# Patient Record
Sex: MALE | Race: WHITE | Employment: OTHER | URBAN - NONMETROPOLITAN AREA
[De-identification: names, ages, dates, MRNs, and addresses within clinical notes are randomized per-mention and may not be internally consistent; named-entity substitution may affect disease eponyms.]

---

## 2017-01-12 ENCOUNTER — OFFICE VISIT (OUTPATIENT)
Dept: PULMONOLOGY | Age: 82
End: 2017-01-12

## 2017-01-12 VITALS
DIASTOLIC BLOOD PRESSURE: 62 MMHG | SYSTOLIC BLOOD PRESSURE: 128 MMHG | RESPIRATION RATE: 16 BRPM | BODY MASS INDEX: 30.93 KG/M2 | OXYGEN SATURATION: 95 % | WEIGHT: 163.8 LBS | HEART RATE: 78 BPM | HEIGHT: 61 IN

## 2017-01-12 DIAGNOSIS — Z99.89 OSA ON CPAP: Primary | ICD-10-CM

## 2017-01-12 DIAGNOSIS — G47.33 OSA ON CPAP: Primary | ICD-10-CM

## 2017-01-12 PROCEDURE — 99213 OFFICE O/P EST LOW 20 MIN: CPT | Performed by: INTERNAL MEDICINE

## 2017-04-27 ENCOUNTER — OFFICE VISIT (OUTPATIENT)
Dept: PULMONOLOGY | Age: 82
End: 2017-04-27

## 2017-04-27 VITALS
WEIGHT: 162 LBS | HEART RATE: 70 BPM | HEIGHT: 61 IN | DIASTOLIC BLOOD PRESSURE: 62 MMHG | OXYGEN SATURATION: 96 % | BODY MASS INDEX: 30.58 KG/M2 | SYSTOLIC BLOOD PRESSURE: 124 MMHG | RESPIRATION RATE: 16 BRPM

## 2017-04-27 DIAGNOSIS — Z99.89 OSA ON CPAP: ICD-10-CM

## 2017-04-27 DIAGNOSIS — G47.33 OSA ON CPAP: ICD-10-CM

## 2017-04-27 PROCEDURE — 1036F TOBACCO NON-USER: CPT | Performed by: INTERNAL MEDICINE

## 2017-04-27 PROCEDURE — G8419 CALC BMI OUT NRM PARAM NOF/U: HCPCS | Performed by: INTERNAL MEDICINE

## 2017-04-27 PROCEDURE — 4040F PNEUMOC VAC/ADMIN/RCVD: CPT | Performed by: INTERNAL MEDICINE

## 2017-04-27 PROCEDURE — 1123F ACP DISCUSS/DSCN MKR DOCD: CPT | Performed by: INTERNAL MEDICINE

## 2017-04-27 PROCEDURE — G8427 DOCREV CUR MEDS BY ELIG CLIN: HCPCS | Performed by: INTERNAL MEDICINE

## 2017-04-27 PROCEDURE — 99213 OFFICE O/P EST LOW 20 MIN: CPT | Performed by: INTERNAL MEDICINE

## 2017-06-09 DIAGNOSIS — Z99.89 OSA ON CPAP: ICD-10-CM

## 2017-06-09 DIAGNOSIS — G47.33 OSA ON CPAP: ICD-10-CM

## 2017-08-10 ENCOUNTER — OFFICE VISIT (OUTPATIENT)
Dept: PULMONOLOGY | Age: 82
End: 2017-08-10
Payer: MEDICARE

## 2017-08-10 VITALS
WEIGHT: 160 LBS | HEIGHT: 61 IN | OXYGEN SATURATION: 95 % | BODY MASS INDEX: 30.21 KG/M2 | DIASTOLIC BLOOD PRESSURE: 70 MMHG | SYSTOLIC BLOOD PRESSURE: 138 MMHG | HEART RATE: 64 BPM

## 2017-08-10 DIAGNOSIS — Z99.89 OSA ON CPAP: Primary | ICD-10-CM

## 2017-08-10 DIAGNOSIS — G47.33 OSA ON CPAP: Primary | ICD-10-CM

## 2017-08-10 PROCEDURE — G8417 CALC BMI ABV UP PARAM F/U: HCPCS | Performed by: INTERNAL MEDICINE

## 2017-08-10 PROCEDURE — 4040F PNEUMOC VAC/ADMIN/RCVD: CPT | Performed by: INTERNAL MEDICINE

## 2017-08-10 PROCEDURE — G8427 DOCREV CUR MEDS BY ELIG CLIN: HCPCS | Performed by: INTERNAL MEDICINE

## 2017-08-10 PROCEDURE — 99213 OFFICE O/P EST LOW 20 MIN: CPT | Performed by: INTERNAL MEDICINE

## 2017-08-10 PROCEDURE — 1123F ACP DISCUSS/DSCN MKR DOCD: CPT | Performed by: INTERNAL MEDICINE

## 2017-08-10 PROCEDURE — 1036F TOBACCO NON-USER: CPT | Performed by: INTERNAL MEDICINE

## 2017-12-15 ENCOUNTER — HOSPITAL ENCOUNTER (INPATIENT)
Age: 82
LOS: 2 days | Discharge: HOME HEALTH | DRG: 310 | End: 2017-12-17
Attending: FAMILY MEDICINE
Payer: MEDICARE

## 2017-12-15 DIAGNOSIS — R00.1 BRADYCARDIA: Primary | ICD-10-CM

## 2017-12-15 LAB
ANION GAP SERPL CALCULATED.3IONS-SCNC: 15 MEQ/L (ref 8–16)
BUN BLDV-MCNC: 26 MG/DL (ref 7–22)
CALCIUM SERPL-MCNC: 8.9 MG/DL (ref 8.5–10.5)
CHLORIDE BLD-SCNC: 100 MEQ/L (ref 98–111)
CO2: 24 MEQ/L (ref 23–33)
CREAT SERPL-MCNC: 1.1 MG/DL (ref 0.4–1.2)
EKG ATRIAL RATE: 72 BPM
EKG P AXIS: 67 DEGREES
EKG P-R INTERVAL: 224 MS
EKG Q-T INTERVAL: 434 MS
EKG QRS DURATION: 144 MS
EKG QTC CALCULATION (BAZETT): 475 MS
EKG R AXIS: -85 DEGREES
EKG T AXIS: -24 DEGREES
EKG VENTRICULAR RATE: 72 BPM
GFR SERPL CREATININE-BSD FRML MDRD: 64 ML/MIN/1.73M2
GLUCOSE BLD-MCNC: 111 MG/DL (ref 70–108)
HCT VFR BLD CALC: 37 % (ref 42–52)
HEMOGLOBIN: 12.4 GM/DL (ref 14–18)
MCH RBC QN AUTO: 33.6 PG (ref 27–31)
MCHC RBC AUTO-ENTMCNC: 33.6 GM/DL (ref 33–37)
MCV RBC AUTO: 100 FL (ref 80–94)
MRSA SCREEN RT-PCR: NEGATIVE
PDW BLD-RTO: 13.1 % (ref 11.5–14.5)
PLATELET # BLD: 217 THOU/MM3 (ref 130–400)
PMV BLD AUTO: 7.7 MCM (ref 7.4–10.4)
POTASSIUM SERPL-SCNC: 4.5 MEQ/L (ref 3.5–5.2)
RBC # BLD: 3.7 MILL/MM3 (ref 4.7–6.1)
SODIUM BLD-SCNC: 139 MEQ/L (ref 135–145)
WBC # BLD: 10.4 THOU/MM3 (ref 4.8–10.8)

## 2017-12-15 PROCEDURE — 2580000003 HC RX 258: Performed by: FAMILY MEDICINE

## 2017-12-15 PROCEDURE — 2060000000 HC ICU INTERMEDIATE R&B

## 2017-12-15 PROCEDURE — 6360000002 HC RX W HCPCS: Performed by: ORTHOPAEDIC SURGERY

## 2017-12-15 PROCEDURE — 93005 ELECTROCARDIOGRAM TRACING: CPT

## 2017-12-15 PROCEDURE — S0028 INJECTION, FAMOTIDINE, 20 MG: HCPCS | Performed by: FAMILY MEDICINE

## 2017-12-15 PROCEDURE — 87641 MR-STAPH DNA AMP PROBE: CPT

## 2017-12-15 PROCEDURE — 36415 COLL VENOUS BLD VENIPUNCTURE: CPT

## 2017-12-15 PROCEDURE — A6252 ABSORPT DRG >16 <=48 W/O BDR: HCPCS

## 2017-12-15 PROCEDURE — 85027 COMPLETE CBC AUTOMATED: CPT

## 2017-12-15 PROCEDURE — 6370000000 HC RX 637 (ALT 250 FOR IP): Performed by: ORTHOPAEDIC SURGERY

## 2017-12-15 PROCEDURE — 2580000003 HC RX 258: Performed by: ORTHOPAEDIC SURGERY

## 2017-12-15 PROCEDURE — 80048 BASIC METABOLIC PNL TOTAL CA: CPT

## 2017-12-15 PROCEDURE — 6370000000 HC RX 637 (ALT 250 FOR IP): Performed by: FAMILY MEDICINE

## 2017-12-15 PROCEDURE — 87081 CULTURE SCREEN ONLY: CPT

## 2017-12-15 PROCEDURE — 2500000003 HC RX 250 WO HCPCS: Performed by: FAMILY MEDICINE

## 2017-12-15 PROCEDURE — 99223 1ST HOSP IP/OBS HIGH 75: CPT | Performed by: FAMILY MEDICINE

## 2017-12-15 RX ORDER — ACETAMINOPHEN 325 MG/1
650 TABLET ORAL EVERY 4 HOURS PRN
Status: DISCONTINUED | OUTPATIENT
Start: 2017-12-15 | End: 2017-12-17 | Stop reason: HOSPADM

## 2017-12-15 RX ORDER — TAMSULOSIN HYDROCHLORIDE 0.4 MG/1
0.4 CAPSULE ORAL 2 TIMES DAILY
Status: DISCONTINUED | OUTPATIENT
Start: 2017-12-15 | End: 2017-12-17 | Stop reason: HOSPADM

## 2017-12-15 RX ORDER — BETHANECHOL CHLORIDE 25 MG/1
50 TABLET ORAL 3 TIMES DAILY
Status: DISCONTINUED | OUTPATIENT
Start: 2017-12-15 | End: 2017-12-17 | Stop reason: HOSPADM

## 2017-12-15 RX ORDER — ONDANSETRON 2 MG/ML
4 INJECTION INTRAMUSCULAR; INTRAVENOUS EVERY 6 HOURS PRN
Status: DISCONTINUED | OUTPATIENT
Start: 2017-12-15 | End: 2017-12-17 | Stop reason: HOSPADM

## 2017-12-15 RX ORDER — ISOSORBIDE MONONITRATE 60 MG/1
60 TABLET, EXTENDED RELEASE ORAL DAILY
Status: DISCONTINUED | OUTPATIENT
Start: 2017-12-15 | End: 2017-12-17 | Stop reason: HOSPADM

## 2017-12-15 RX ORDER — RAMIPRIL 5 MG/1
5 CAPSULE ORAL 2 TIMES DAILY
Status: DISCONTINUED | OUTPATIENT
Start: 2017-12-15 | End: 2017-12-17 | Stop reason: HOSPADM

## 2017-12-15 RX ORDER — MORPHINE SULFATE 2 MG/ML
2 INJECTION, SOLUTION INTRAMUSCULAR; INTRAVENOUS EVERY 4 HOURS PRN
Status: DISCONTINUED | OUTPATIENT
Start: 2017-12-15 | End: 2017-12-15

## 2017-12-15 RX ORDER — HYDROCODONE BITARTRATE AND ACETAMINOPHEN 5; 325 MG/1; MG/1
1 TABLET ORAL EVERY 6 HOURS PRN
Status: DISCONTINUED | OUTPATIENT
Start: 2017-12-15 | End: 2017-12-17 | Stop reason: HOSPADM

## 2017-12-15 RX ORDER — PANTOPRAZOLE SODIUM 40 MG/1
40 TABLET, DELAYED RELEASE ORAL DAILY
Status: DISCONTINUED | OUTPATIENT
Start: 2017-12-15 | End: 2017-12-17 | Stop reason: HOSPADM

## 2017-12-15 RX ORDER — ASPIRIN 81 MG/1
81 TABLET ORAL DAILY
Status: DISCONTINUED | OUTPATIENT
Start: 2017-12-15 | End: 2017-12-15

## 2017-12-15 RX ORDER — SODIUM CHLORIDE 0.9 % (FLUSH) 0.9 %
10 SYRINGE (ML) INJECTION EVERY 12 HOURS SCHEDULED
Status: DISCONTINUED | OUTPATIENT
Start: 2017-12-15 | End: 2017-12-17 | Stop reason: HOSPADM

## 2017-12-15 RX ORDER — SODIUM CHLORIDE 0.9 % (FLUSH) 0.9 %
10 SYRINGE (ML) INJECTION PRN
Status: DISCONTINUED | OUTPATIENT
Start: 2017-12-15 | End: 2017-12-17 | Stop reason: HOSPADM

## 2017-12-15 RX ORDER — SODIUM CHLORIDE 9 MG/ML
INJECTION, SOLUTION INTRAVENOUS CONTINUOUS
Status: DISCONTINUED | OUTPATIENT
Start: 2017-12-15 | End: 2017-12-17 | Stop reason: HOSPADM

## 2017-12-15 RX ORDER — AMLODIPINE BESYLATE 5 MG/1
5 TABLET ORAL 2 TIMES DAILY
Status: DISCONTINUED | OUTPATIENT
Start: 2017-12-15 | End: 2017-12-17 | Stop reason: HOSPADM

## 2017-12-15 RX ORDER — MORPHINE SULFATE 2 MG/ML
1 INJECTION, SOLUTION INTRAMUSCULAR; INTRAVENOUS EVERY 4 HOURS PRN
Status: DISCONTINUED | OUTPATIENT
Start: 2017-12-15 | End: 2017-12-15

## 2017-12-15 RX ORDER — ATORVASTATIN CALCIUM 40 MG/1
40 TABLET, FILM COATED ORAL NIGHTLY
Status: DISCONTINUED | OUTPATIENT
Start: 2017-12-15 | End: 2017-12-17 | Stop reason: HOSPADM

## 2017-12-15 RX ADMIN — TAMSULOSIN HYDROCHLORIDE 0.4 MG: 0.4 CAPSULE ORAL at 19:42

## 2017-12-15 RX ADMIN — AMLODIPINE BESYLATE 5 MG: 5 TABLET ORAL at 19:42

## 2017-12-15 RX ADMIN — RAMIPRIL 5 MG: 5 CAPSULE ORAL at 19:42

## 2017-12-15 RX ADMIN — HYDROCODONE BITARTRATE AND ACETAMINOPHEN 1 TABLET: 5; 325 TABLET ORAL at 23:51

## 2017-12-15 RX ADMIN — FAMOTIDINE 20 MG: 10 INJECTION, SOLUTION INTRAVENOUS at 21:28

## 2017-12-15 RX ADMIN — CEFAZOLIN SODIUM 2 G: 1 INJECTION, POWDER, FOR SOLUTION INTRAMUSCULAR; INTRAVENOUS at 22:16

## 2017-12-15 RX ADMIN — SODIUM CHLORIDE: 9 INJECTION, SOLUTION INTRAVENOUS at 18:00

## 2017-12-15 RX ADMIN — Medication 10 ML: at 21:28

## 2017-12-15 RX ADMIN — BETHANECHOL CHLORIDE 25 MG 50 MG: 25 TABLET ORAL at 23:51

## 2017-12-15 RX ADMIN — ATORVASTATIN CALCIUM 40 MG: 40 TABLET, FILM COATED ORAL at 19:42

## 2017-12-15 ASSESSMENT — PAIN DESCRIPTION - PROGRESSION
CLINICAL_PROGRESSION: GRADUALLY IMPROVING
CLINICAL_PROGRESSION: GRADUALLY IMPROVING

## 2017-12-15 ASSESSMENT — PAIN SCALES - GENERAL
PAINLEVEL_OUTOF10: 4
PAINLEVEL_OUTOF10: 3

## 2017-12-15 ASSESSMENT — PAIN DESCRIPTION - PAIN TYPE: TYPE: SURGICAL PAIN

## 2017-12-15 ASSESSMENT — PAIN DESCRIPTION - ORIENTATION: ORIENTATION: RIGHT

## 2017-12-15 ASSESSMENT — PAIN DESCRIPTION - ONSET: ONSET: ON-GOING

## 2017-12-15 ASSESSMENT — PAIN DESCRIPTION - DESCRIPTORS: DESCRIPTORS: ACHING

## 2017-12-15 ASSESSMENT — PAIN DESCRIPTION - LOCATION: LOCATION: HIP

## 2017-12-15 ASSESSMENT — PAIN DESCRIPTION - FREQUENCY: FREQUENCY: INTERMITTENT

## 2017-12-15 NOTE — H&P
POTASSIUM PO Take 99 mg by mouth 2 times daily     Historical Provider, MD   CPAP Machine MISC by Does not apply route Please change CPAP pressure to 12 cm H20. 7/14/16   Hudson Santacruz MD   isosorbide mononitrate (IMDUR) 30 MG CR tablet Take 60 mg by mouth daily     Historical Provider, MD   ascorbic acid (VITAMIN C) 500 MG tablet Take 500 mg by mouth daily. Historical Provider, MD   atorvastatin (LIPITOR) 20 MG tablet Take 40 mg by mouth daily     Historical Provider, MD   b complex vitamins capsule Take 1 capsule by mouth daily. Historical Provider, MD   bethanechol (URECHOLINE) 25 MG tablet   Take 50 mg by mouth 3 times daily     Historical Provider, MD   clopidogrel (PLAVIX) 75 MG tablet   Take 75 mg by mouth three times a week     Historical Provider, MD   ramipril (ALTACE) 2.5 MG capsule Take 5 mg by mouth 2 times daily. Historical Provider, MD   amLODIPine (NORVASC) 5 MG tablet Take by mouth 2 times daily     Historical Provider, MD   aspirin 81 MG tablet Take 81 mg by mouth daily. Historical Provider, MD   tamsulosin (FLOMAX) 0.4 MG capsule Take 0.4 mg by mouth 2 times daily     Historical Provider, MD       Allergies:  Pcn [penicillins]    Social History:      The patient currently lives at home    TOBACCO:   reports that he quit smoking about 59 years ago. His smoking use included Cigarettes. He quit after 8.00 years of use. He quit smokeless tobacco use about 10 years ago. His smokeless tobacco use included Chew. ETOH:   reports that he does not drink alcohol. Family History:    Reviewed in detail and negative for DM, CAD, Cancer, CVA. Positive as follows:    No family history on file. Diet:  DIET CARDIAC;    REVIEW OF SYSTEMS:   Pertinent positives as noted in the HPI. All other systems reviewed and negative. PHYSICAL EXAM:    There were no vitals taken for this visit. General appearance:  No apparent distress, appears stated age and cooperative.   HEENT:  Normal

## 2017-12-16 LAB
LV EF: 63 %
LVEF MODALITY: NORMAL
TSH SERPL DL<=0.05 MIU/L-ACNC: 0.69 UIU/ML (ref 0.4–4.2)

## 2017-12-16 PROCEDURE — S0028 INJECTION, FAMOTIDINE, 20 MG: HCPCS | Performed by: PHARMACIST

## 2017-12-16 PROCEDURE — 99233 SBSQ HOSP IP/OBS HIGH 50: CPT | Performed by: INTERNAL MEDICINE

## 2017-12-16 PROCEDURE — 6370000000 HC RX 637 (ALT 250 FOR IP): Performed by: INTERNAL MEDICINE

## 2017-12-16 PROCEDURE — 2060000000 HC ICU INTERMEDIATE R&B

## 2017-12-16 PROCEDURE — 6370000000 HC RX 637 (ALT 250 FOR IP): Performed by: ORTHOPAEDIC SURGERY

## 2017-12-16 PROCEDURE — 6360000002 HC RX W HCPCS: Performed by: ORTHOPAEDIC SURGERY

## 2017-12-16 PROCEDURE — 2580000003 HC RX 258: Performed by: FAMILY MEDICINE

## 2017-12-16 PROCEDURE — 93306 TTE W/DOPPLER COMPLETE: CPT

## 2017-12-16 PROCEDURE — 84443 ASSAY THYROID STIM HORMONE: CPT

## 2017-12-16 PROCEDURE — 6370000000 HC RX 637 (ALT 250 FOR IP): Performed by: FAMILY MEDICINE

## 2017-12-16 PROCEDURE — 99223 1ST HOSP IP/OBS HIGH 75: CPT | Performed by: INTERNAL MEDICINE

## 2017-12-16 PROCEDURE — 36415 COLL VENOUS BLD VENIPUNCTURE: CPT

## 2017-12-16 PROCEDURE — 2500000003 HC RX 250 WO HCPCS: Performed by: PHARMACIST

## 2017-12-16 RX ORDER — HYDROCODONE BITARTRATE AND ACETAMINOPHEN 5; 325 MG/1; MG/1
2 TABLET ORAL EVERY 6 HOURS PRN
Qty: 42 TABLET | Refills: 0 | Status: SHIPPED | OUTPATIENT
Start: 2017-12-16 | End: 2017-12-23

## 2017-12-16 RX ORDER — METOPROLOL SUCCINATE 25 MG/1
25 TABLET, EXTENDED RELEASE ORAL DAILY
Status: DISCONTINUED | OUTPATIENT
Start: 2017-12-16 | End: 2017-12-17 | Stop reason: HOSPADM

## 2017-12-16 RX ORDER — HYDROCODONE BITARTRATE AND ACETAMINOPHEN 5; 325 MG/1; MG/1
2 TABLET ORAL EVERY 6 HOURS PRN
Qty: 42 TABLET | Refills: 0 | Status: CANCELLED | OUTPATIENT
Start: 2017-12-16 | End: 2017-12-23

## 2017-12-16 RX ADMIN — HYDROCODONE BITARTRATE AND ACETAMINOPHEN 1 TABLET: 5; 325 TABLET ORAL at 14:46

## 2017-12-16 RX ADMIN — RAMIPRIL 5 MG: 5 CAPSULE ORAL at 20:26

## 2017-12-16 RX ADMIN — BETHANECHOL CHLORIDE 25 MG 50 MG: 25 TABLET ORAL at 14:49

## 2017-12-16 RX ADMIN — AMLODIPINE BESYLATE 5 MG: 5 TABLET ORAL at 08:12

## 2017-12-16 RX ADMIN — CEFAZOLIN SODIUM 2 G: 2 SOLUTION INTRAVENOUS at 14:47

## 2017-12-16 RX ADMIN — ATORVASTATIN CALCIUM 40 MG: 40 TABLET, FILM COATED ORAL at 20:27

## 2017-12-16 RX ADMIN — BETHANECHOL CHLORIDE 25 MG 50 MG: 25 TABLET ORAL at 20:26

## 2017-12-16 RX ADMIN — TAMSULOSIN HYDROCHLORIDE 0.4 MG: 0.4 CAPSULE ORAL at 08:16

## 2017-12-16 RX ADMIN — RAMIPRIL 5 MG: 5 CAPSULE ORAL at 08:15

## 2017-12-16 RX ADMIN — PANTOPRAZOLE SODIUM 40 MG: 40 TABLET, DELAYED RELEASE ORAL at 08:15

## 2017-12-16 RX ADMIN — METOPROLOL SUCCINATE 25 MG: 25 TABLET, EXTENDED RELEASE ORAL at 14:49

## 2017-12-16 RX ADMIN — Medication 10 ML: at 08:16

## 2017-12-16 RX ADMIN — ISOSORBIDE MONONITRATE 60 MG: 60 TABLET, EXTENDED RELEASE ORAL at 08:15

## 2017-12-16 RX ADMIN — ASPIRIN 325 MG: 325 TABLET, DELAYED RELEASE ORAL at 09:08

## 2017-12-16 RX ADMIN — BETHANECHOL CHLORIDE 25 MG 50 MG: 25 TABLET ORAL at 08:14

## 2017-12-16 RX ADMIN — Medication 10 ML: at 20:27

## 2017-12-16 RX ADMIN — AMLODIPINE BESYLATE 5 MG: 5 TABLET ORAL at 20:26

## 2017-12-16 RX ADMIN — HYDROCODONE BITARTRATE AND ACETAMINOPHEN 1 TABLET: 5; 325 TABLET ORAL at 08:09

## 2017-12-16 RX ADMIN — FAMOTIDINE 20 MG: 10 INJECTION, SOLUTION INTRAVENOUS at 11:08

## 2017-12-16 RX ADMIN — HYDROCODONE BITARTRATE AND ACETAMINOPHEN 1 TABLET: 5; 325 TABLET ORAL at 21:41

## 2017-12-16 RX ADMIN — TAMSULOSIN HYDROCHLORIDE 0.4 MG: 0.4 CAPSULE ORAL at 20:26

## 2017-12-16 RX ADMIN — CEFAZOLIN SODIUM 2 G: 2 SOLUTION INTRAVENOUS at 05:34

## 2017-12-16 ASSESSMENT — PAIN DESCRIPTION - ORIENTATION
ORIENTATION: RIGHT
ORIENTATION: RIGHT

## 2017-12-16 ASSESSMENT — PAIN DESCRIPTION - PROGRESSION

## 2017-12-16 ASSESSMENT — PAIN DESCRIPTION - LOCATION
LOCATION: HIP
LOCATION: HIP

## 2017-12-16 ASSESSMENT — PAIN DESCRIPTION - ONSET
ONSET: ON-GOING
ONSET: ON-GOING

## 2017-12-16 ASSESSMENT — PAIN DESCRIPTION - DESCRIPTORS
DESCRIPTORS: ACHING
DESCRIPTORS: ACHING

## 2017-12-16 ASSESSMENT — PAIN SCALES - GENERAL
PAINLEVEL_OUTOF10: 4
PAINLEVEL_OUTOF10: 3
PAINLEVEL_OUTOF10: 2
PAINLEVEL_OUTOF10: 6
PAINLEVEL_OUTOF10: 0
PAINLEVEL_OUTOF10: 0
PAINLEVEL_OUTOF10: 4

## 2017-12-16 ASSESSMENT — PAIN DESCRIPTION - FREQUENCY
FREQUENCY: INTERMITTENT
FREQUENCY: INTERMITTENT

## 2017-12-16 ASSESSMENT — PAIN DESCRIPTION - PAIN TYPE
TYPE: SURGICAL PAIN
TYPE: SURGICAL PAIN

## 2017-12-16 NOTE — CONSULTS
Inpatient consult to Cardiology  Consult performed by: Yusuf Vidales ordered by: Jose De Jesus Whatley A          Episode of post op Sinus Bradycardia   CAD s/p Stent 5 yrs back  S/p Rt Hip replacement\  Post op day 1    Plan  Echo  Holter on DC  May resume toprol xl 25 po qd rhys    [id number]    Maryellen Steiner MD

## 2017-12-16 NOTE — PROGRESS NOTES
Renal Adjustment Per Protocol  Recent Labs      12/15/17   1655   BUN  26*       Recent Labs      12/15/17   1655   CREATININE  1.1       Estimated Creatinine Clearance: 43 mL/min (based on SCr of 1.1 mg/dL).       Plan: Change Pepcid 20 mg daily     Derrell Navarrete PharmD 12/16/2017 8:43 AM

## 2017-12-16 NOTE — PROGRESS NOTES
Progress Note    Subjective:     Post-Operative Day: 1 Status Post right Total Hip Arthroplasty  Systemic or Specific Complaints:No Complaints    Objective:   VITALS:  /65   Pulse 65   Temp 98.3 °F (36.8 °C) (Oral)   Resp 16   Ht 5' 2\" (1.575 m)   Wt 164 lb (74.4 kg)   SpO2 93%   BMI 30.00 kg/m²     General: alert, appears stated age and cooperative   Wound: Wound clean and dry no evidence of infection. DVT Exam: No evidence of DVT seen on physical exam.   Abdomen soft and non tender  NVI in lower extremity. Thigh swollen but soft. Moving foot and ankle. Data Review  CBC:   Lab Results   Component Value Date    WBC 10.4 12/15/2017    RBC 3.70 12/15/2017    HGB 12.4 12/15/2017    HCT 37.0 12/15/2017     12/15/2017     BMP:   Lab Results   Component Value Date     12/15/2017    K 4.5 12/15/2017     12/15/2017    CO2 24 12/15/2017    BUN 26 12/15/2017    CREATININE 1.1 12/15/2017    GLUCOSE 111 12/15/2017       Assessment:     Status Post right Total Hip Arthroplasty.  Complications: bradycardia     Plan:      1:  Continue Total Hip Replacement protocol   2:  Continue Deep venous thrombosis prophylaxis  3:  Continue physical therapy   4:  Continue Pain Control  5:  Monitor Labs  6:  Discharge pending; ok from ortho as long as he does well with PT, asa 325 and plavix continued, norco script in chart    Tasneem Duque PA-C in collaboration with Dr. Mariaa Gant

## 2017-12-16 NOTE — PLAN OF CARE
family. Patient and family verbalize understanding of the plan of care and contribute to goal setting.

## 2017-12-16 NOTE — DISCHARGE INSTR - DIET

## 2017-12-16 NOTE — PROGRESS NOTES
[address] THERAPY MISSED TREATMENT NOTE  [facility] ICU STEPDOWN TELEMETRY 4K  4K-[id number]      Date: 2017  Patient Name: Vitaly Gautam        CSN: [id number]   : 1932  (80 y.o.)  Gender: male   Referring Practitioner: Evin Jurado MD  Diagnosis: Bradycardia         REASON FOR MISSED TREATMENT:  Pt is s/p hip surgery with Bradycardia. Per nursing student and tech, pt was moving well this AM. Upon arrival to room RN present administering pain meds with pt with significant increased pain.  Will check back tomorrow AM for OT eval.

## 2017-12-16 NOTE — FLOWSHEET NOTE
12/15/17 2144   Provider Notification   Reason for Communication Review case   [facility]   Provider Notification Physician Assistant   Method of Communication Call   Response No new orders   Notification Time 2144   [facility] called back and stated she talked to Dr. Kris Blood and he stated he would be in to see the pt in the am.

## 2017-12-17 VITALS
TEMPERATURE: 97.8 F | HEART RATE: 54 BPM | OXYGEN SATURATION: 92 % | HEIGHT: 62 IN | DIASTOLIC BLOOD PRESSURE: 58 MMHG | SYSTOLIC BLOOD PRESSURE: 112 MMHG | WEIGHT: 163.4 LBS | RESPIRATION RATE: 16 BRPM | BODY MASS INDEX: 30.07 KG/M2

## 2017-12-17 LAB
MRSA SCREEN: NORMAL
VRE CULTURE: NORMAL

## 2017-12-17 PROCEDURE — 6370000000 HC RX 637 (ALT 250 FOR IP): Performed by: ORTHOPAEDIC SURGERY

## 2017-12-17 PROCEDURE — 6370000000 HC RX 637 (ALT 250 FOR IP): Performed by: FAMILY MEDICINE

## 2017-12-17 PROCEDURE — 97165 OT EVAL LOW COMPLEX 30 MIN: CPT

## 2017-12-17 PROCEDURE — S0028 INJECTION, FAMOTIDINE, 20 MG: HCPCS | Performed by: PHARMACIST

## 2017-12-17 PROCEDURE — 2500000003 HC RX 250 WO HCPCS: Performed by: PHARMACIST

## 2017-12-17 PROCEDURE — 93225 XTRNL ECG REC<48 HRS REC: CPT

## 2017-12-17 PROCEDURE — 99232 SBSQ HOSP IP/OBS MODERATE 35: CPT | Performed by: PHYSICIAN ASSISTANT

## 2017-12-17 PROCEDURE — G8988 SELF CARE GOAL STATUS: HCPCS

## 2017-12-17 PROCEDURE — 99238 HOSP IP/OBS DSCHRG MGMT 30/<: CPT | Performed by: INTERNAL MEDICINE

## 2017-12-17 PROCEDURE — 97530 THERAPEUTIC ACTIVITIES: CPT

## 2017-12-17 PROCEDURE — 93226 XTRNL ECG REC<48 HR SCAN A/R: CPT

## 2017-12-17 PROCEDURE — G8987 SELF CARE CURRENT STATUS: HCPCS

## 2017-12-17 RX ADMIN — HYDROCODONE BITARTRATE AND ACETAMINOPHEN 1 TABLET: 5; 325 TABLET ORAL at 05:47

## 2017-12-17 RX ADMIN — AMLODIPINE BESYLATE 5 MG: 5 TABLET ORAL at 08:16

## 2017-12-17 RX ADMIN — PANTOPRAZOLE SODIUM 40 MG: 40 TABLET, DELAYED RELEASE ORAL at 08:18

## 2017-12-17 RX ADMIN — TAMSULOSIN HYDROCHLORIDE 0.4 MG: 0.4 CAPSULE ORAL at 08:19

## 2017-12-17 RX ADMIN — BETHANECHOL CHLORIDE 25 MG 50 MG: 25 TABLET ORAL at 08:17

## 2017-12-17 RX ADMIN — FAMOTIDINE 20 MG: 10 INJECTION, SOLUTION INTRAVENOUS at 08:25

## 2017-12-17 RX ADMIN — ISOSORBIDE MONONITRATE 60 MG: 60 TABLET, EXTENDED RELEASE ORAL at 08:18

## 2017-12-17 RX ADMIN — ASPIRIN 325 MG: 325 TABLET, DELAYED RELEASE ORAL at 08:16

## 2017-12-17 RX ADMIN — HYDROCODONE BITARTRATE AND ACETAMINOPHEN 1 TABLET: 5; 325 TABLET ORAL at 12:16

## 2017-12-17 ASSESSMENT — PAIN DESCRIPTION - FREQUENCY
FREQUENCY: INTERMITTENT
FREQUENCY: INTERMITTENT

## 2017-12-17 ASSESSMENT — PAIN SCALES - GENERAL
PAINLEVEL_OUTOF10: 4
PAINLEVEL_OUTOF10: 5
PAINLEVEL_OUTOF10: 4
PAINLEVEL_OUTOF10: 7

## 2017-12-17 ASSESSMENT — PAIN DESCRIPTION - ORIENTATION
ORIENTATION: RIGHT
ORIENTATION: RIGHT

## 2017-12-17 ASSESSMENT — PAIN DESCRIPTION - PAIN TYPE
TYPE: SURGICAL PAIN
TYPE: SURGICAL PAIN

## 2017-12-17 ASSESSMENT — PAIN DESCRIPTION - PROGRESSION: CLINICAL_PROGRESSION: GRADUALLY WORSENING

## 2017-12-17 ASSESSMENT — PAIN DESCRIPTION - LOCATION
LOCATION: HIP
LOCATION: HIP

## 2017-12-17 ASSESSMENT — PAIN DESCRIPTION - DESCRIPTORS
DESCRIPTORS: ACHING;SORE
DESCRIPTORS: ACHING

## 2017-12-17 NOTE — DISCHARGE SUMMARY
appears stated age and cooperative. HEENT:  Normal cephalic, atraumatic without obvious deformity. Pupils equal, round, and reactive to light. Extra ocular muscles intact. Conjunctivae/corneas clear. Neck: Supple, with full range of motion. No jugular venous distention. Trachea midline. Respiratory:  Normal respiratory effort. Clear to auscultation, bilaterally without Rales/Wheezes/Rhonchi. Cardiovascular:  Regular rate and rhythm with normal S1/S2 without murmurs, rubs or gallops. Abdomen: Soft, non-tender, non-distended with normal bowel sounds. Musculoskeletal:  No clubbing, cyanosis or edema bilaterally. Full range of motion without deformity. Skin: Skin color, texture, turgor normal.  No rashes or lesions. Neurologic:  Neurovascularly intact without any focal sensory/motor deficits. Cranial nerves: II-XII intact, grossly non-focal.  Psychiatric:  Alert and oriented, thought content appropriate, normal insight  Capillary Refill: Brisk,< 3 seconds   Peripheral Pulses: +2 palpable, equal bilaterally       Labs: For convenience and continuity at follow-up the following most recent labs are provided:      CBC:    Lab Results   Component Value Date    WBC 10.4 12/15/2017    HGB 12.4 12/15/2017    HCT 37.0 12/15/2017     12/15/2017       Renal:  Lab Results   Component Value Date     12/15/2017    K 4.5 12/15/2017     12/15/2017    CO2 24 12/15/2017    BUN 26 12/15/2017    CREATININE 1.1 12/15/2017    CALCIUM 8.9 12/15/2017         Significant Diagnostic Studies    Radiology:   No orders to display          Consults:     IP CONSULT TO ORTHOPEDIC SURGERY  IP CONSULT TO CARDIOLOGY    Disposition:    [x] Home        [] TCU       [] Rehab       [] Psych       [] SNF       [] [place]       [] Other-    Condition at Discharge: Stable    Code Status:  Full Code     Patient Instructions:    Discharge lab work: no  Activity: per PT instructions  Diet: DIET CARDIAC;       Follow-up visits:   Keyona Rhodes MD  [id number] [address]  Cathleen Vinson       Call office to schedule a follow up appointment within 1-2 weeks. Sherry Gibson MD  [address]  [phone]      Call office on Monday morning to schedule a follow up appointment with in one week. [facility] [address]  [address] [facility]  [facility]      Call office to arrange Home Health needs with [facility] PT/OT Nursing care and wound care. Nikolay Pritchardadrian [facility] SELAM Rosales      Follow up in one - two weeks. Review Holter Monitor Results         Discharge Medications:      [facility] Medication Instructions WWW:[id number]    Printed on:12/17/17 0913   Medication Information                      amLODIPine (NORVASC) 5 MG tablet  Take by mouth 2 times daily              ascorbic acid (VITAMIN C) 500 MG tablet  Take 500 mg by mouth daily. aspirin 325 MG EC tablet  Take 1 tablet by mouth daily             atorvastatin (LIPITOR) 20 MG tablet  Take 40 mg by mouth daily              b complex vitamins capsule  Take 1 capsule by mouth daily. bethanechol (URECHOLINE) 25 MG tablet    Take 50 mg by mouth 3 times daily              clopidogrel (PLAVIX) 75 MG tablet    Take 75 mg by mouth three times a week              CPAP Machine MISC  by Does not apply route Please change CPAP pressure to 12 cm H20. CPAP Machine MISC  by Does not apply route Please change his current CPAP therapy to Auto CPAP with minimum pressure of 6cm H20 and Maximum pressure of 20cm H20. Please do mask refit to avoid leaks and to get good seal. Provide chin strap in needed. CPAP Machine MISC  by Does not apply route Please change CPAP pressure to 11 cm H20. Keep scheduled follow up with my clinic with down load before clinic visit.              HYDROcodone-acetaminophen (NORCO) 5-325 MG per tablet  Take 2 tablets by mouth every 6 hours as needed for Pain .             isosorbide mononitrate (IMDUR) 30 MG CR tablet  Take 60 mg by mouth daily              metoprolol succinate ER (TOPROL-XL) 25 MG XL tablet  Take 25 mg by mouth daily             pantoprazole (PROTONIX) 40 MG tablet  Take 40 mg by mouth daily             POTASSIUM PO  Take 99 mg by mouth 2 times daily              ramipril (ALTACE) 2.5 MG capsule  Take 5 mg by mouth 2 times daily. tamsulosin (FLOMAX) 0.4 MG capsule  Take 0.4 mg by mouth 2 times daily              vitamin D 1000 UNITS CAPS  Take 2,000 Units by mouth daily                  Time Spent on discharge is more than 15 minutes in the examination, evaluation, counseling and review of medications and discharge plan. Discharge Medications for PCI/MI (performed or attempted):   ASA:   yes    Statin:   no  ACE/ARB:  Yes   P2Y12 Inhibitor:  yes   Beta Blocker:   yes  Nitro SL:   no   Cardiac Rehab:  no  Dietary Consult:  no           Signed: Thank you Francia Lubin MD for the opportunity to be involved in this patient's care.     Electronically signed by Franci Boswell MD on 12/17/2017 at 9:13 AM

## 2017-12-17 NOTE — PROCEDURES
48 hr holter applied with monitor [id number]. Instructions given. Patient verbalized understands.  given.   Leidy Shin

## 2017-12-17 NOTE — PROGRESS NOTES
of AE during LB ADLs with pt reporting wife already has all LB AE and he was provided with handout and demo of how to use LB AE in VAUGHN class prior to surgery. Pt declined need to trial LB AE this session, reporting wife will be able to assist/demo use of AE. Shower Transfers: Supervision  Shower Transfers Comments: Simulated transfer     Bed mobility  Supine to Sit: Modified independent (increased time, HOB elevated, use of bedrails)  Scooting: Modified independent (advancing hips to EOB; scooting back into chair)    Transfers  Sit to stand: Supervision (from EOB; minimal cues provided for safe hand placement, RLE placement to maintain VAUGHN precautions)  Stand to sit: Supervision (to chair; 1 vc for RLE placement to maintain VAUGHN precautions)  Toilet Transfers  Toilet - Technique: Ambulating  Equipment Used: Standard toilet (with grab bar on R)  Toilet Transfer: Supervision  Toilet Transfers Comments: CUe to extend RLE during transfer to maintain VAUGHN precautions  Shower Transfers  Shower - Transfer From: Rachelle Barrios - Transfer Type: To  Shower - Transfer To: Standing  Shower - Technique: Ambulating  Shower Transfers: Supervision  Shower Transfers Comments: Simulated transfer    Balance  Sitting Balance: Independent (at EOB)  Standing Balance: Supervision     Time: ~1 minute X2 sets  Activity: prep for mobility  Comment: No UE support required with static standing     Functional Mobility  Functional - Mobility Device: Standard Walker  Activity: To/from bathroom, Other  Assist Level: Supervision  Functional Mobility Comments: Pt demoed functional mobility in room, to/from bathroom, and distance in hallway comparable to household distance with suprvision and SW. Steady, but slower pace, no LOB. Slight increase in SOB which pt reported is at baseline.         Activity Tolerance:  Activity Tolerance: Patient Tolerated treatment well  Activity Tolerance: Education provided re: slowly increasing activity at home, demoing mobility every ~1 hour with pt verbalizing understanding. Recommended use of shower chair and LB AE prn to increase independence/tolerance for activity and ensure maintaining VAUGHN precautions. Pt verbalized understanding of all. Assessment:  Assessment: Pt presents close to baseline status for ADLs, transfers, and mobility, although if does not D/C to home this date would continue to benefit from OT services to increase independence with these tasks and ensure maintenance of VAUGHN precautions. Performance deficits / Impairments: Decreased ADL status, Decreased strength, Decreased endurance  Prognosis: Good  Discharge Recommendations: Home with assist PRN, Home with Home health OT (Pt safe to return home with assist when medically stable). Clinical Decision Making: Clinical Decision making was of Low Complexity as the result of analysis of data from a problem focused assessment, a consideration of a limited number of treatment options, no significant comorbidities affecting the plan of care and no modification or assistance required to complete the evaluation.     Patient Education:  Patient Education: OT role, POC, discharge recommendations, VAUGHN precautions, modified ADL/IADL tasks, DME/AE recommendations    Equipment Recommendations:  Equipment Needed: Yes  Other: recommend use of shower chair, LB AE (pt already has all items)    Safety:  Safety Devices in place: Yes  Type of devices: Call light within reach, Chair alarm in place, Gait belt, Left in chair, Nurse notified    Plan:  Times per week: 1-2x  Current Treatment Recommendations: Strengthening, Endurance Training, Self-Care / ADL, Equipment Evaluation, Education, & procurement, Patient/Caregiver Education & Training  Plan Comment: [facility]    Goals:  Patient goals : \"go home today\"    Short term goals  Time Frame for Short term goals: 1 week  Short term goal 1: Pt will complete all functional transfers with MI, no cues

## 2017-12-17 NOTE — PROGRESS NOTES
Acct: [de-identified]  Admit Date:  12/15/2017  Primary Cardiologist: Dr Diann Haynes    Chief Complaint/Reason for Consultation: Bradycardia    Subjective (Events in last 24 hours):   Pt denies any CP, SOB or palpitations. No syncope or pre-syncope.         Objective:   BP (!) 112/58   Pulse 54   Temp 97.8 °F (36.6 °C) (Oral)   Resp 16   Ht 5' 2\" (1.575 m)   Wt 163 lb 6.4 oz (74.1 kg)   SpO2 92%   BMI 29.89 kg/m²        TELEMETRY: [place]    Physical Exam:  General Appearance: alert and oriented to person, place and time, in no acute distress  Cardiovascular: normal rate, regular rhythm, normal S1 and S2, no murmurs, rubs, clicks, or gallops, distal pulses intact, no carotid bruits, no JVD  Pulmonary/Chest: clear to auscultation bilaterally- no wheezes, rales or rhonchi, normal air movement, no respiratory distress  Abdomen: soft, non-tender, non-distended, normal bowel sounds, no masses Extremities: no cyanosis, clubbing or edema, pulse   Skin: warm and dry, no rash or erythema  Head: normocephalic and atraumatic  Eyes: pupils equal, round, and reactive to light  Neck: supple and non-tender without mass, no thyromegaly   Musculoskeletal: normal range of motion, no joint swelling, deformity or tenderness  Neurological: alert, oriented, normal speech, no focal findings or movement disorder noted    Medications:    famotidine (PEPCID) injection  20 mg Intravenous Daily    metoprolol succinate  25 mg Oral Daily    amLODIPine  5 mg Oral BID    atorvastatin  40 mg Oral Nightly    bethanechol  50 mg Oral TID    isosorbide mononitrate  60 mg Oral Daily    pantoprazole  40 mg Oral Daily    ramipril  5 mg Oral BID    tamsulosin  0.4 mg Oral BID    sodium chloride flush  10 mL Intravenous 2 times per day    aspirin  325 mg Oral Daily      sodium chloride 75 mL/hr at 12/15/17 1800       sodium chloride flush 10 mL PRN   acetaminophen 650 mg Q4H PRN   magnesium hydroxide 30 mL Daily PRN   ondansetron 4 mg Q6H PRN HYDROcodone 5 mg - acetaminophen 1 tablet Q6H PRN       Diagnostics:  EKG:  Odell Wilson    Echo: 12/16/2017  Conclusions      Summary   Normal left ventricle size and systolic function. Ejection fraction was   estimated at 60 to 65 %. There were no regional left ventricular wall   motion abnormalities and wall thickness was within normal limits.   Doppler parameters were consistent with abnormal left ventricular   relaxation (grade 1 diastolic dysfunction).   The left atrium is Mildly dilated.   Aortic valve appears tricuspid. Aortic valve leaflets are Mildly   calcified.   Leaflets exhibited mild to moderately increased thickness and mildly   reduced cuspal separation of the aortic valve.   Mild aortic stenosis is present. Mild aortic regurgitation is noted.   The maximum aortic valve gradient is 25 mmHg, the mean gradient is 12   mmHg, and the peak velocity is 2.5 m/s. Lab Data:    Cardiac Enzymes:  No results for input(s): CKTOTAL, CKMB, CKMBINDEX, TROPONINI in the last 72 hours.     CBC:   Lab Results   Component Value Date    WBC 10.4 12/15/2017    RBC 3.70 12/15/2017    HGB 12.4 12/15/2017    HCT 37.0 12/15/2017     12/15/2017       CMP:  Lab Results   Component Value Date     12/15/2017    K 4.5 12/15/2017     12/15/2017    CO2 24 12/15/2017    BUN 26 12/15/2017    CREATININE 1.1 12/15/2017    LABGLOM 64 12/15/2017    GLUCOSE 111 12/15/2017    CALCIUM 8.9 12/15/2017       Hepatic Function Panel:  No results found for: ALKPHOS, ALT, AST, PROT, BILITOT, BILIDIR, IBILI, LABALBU    Magnesium:  No results found for: MG    PT/INR:  No results found for: PROTIME, INR    HgBA1c:  No results found for: LABA1C    FLP:  No results found for: TRIG, HDL, LDLCALC, LDLDIRECT, LABVLDL    TSH:    Lab Results   Component Value Date    TSH 0.686 12/16/2017         Assessment:  · Sinus Bradycardia  · S/P R Hip replacement  · H/O PCI      Plan:  · Hold parameters on Toprol  · 48 Hr holter monitor  · F/U with  Anike in 2-4 weeks         Electronically signed by Dylan Lux PA-C on 12/17/2017 at 9:49 AM

## 2017-12-17 NOTE — PROGRESS NOTES
Patient ready for discharge. Discharge instructions explained in detail to patient and family at the bedside. 48 hour Holter monitor applied by EKG tech Carlita Hawk   AVS and orders for Home Health needs faxed to [facility]. Informed patient about follow up appointments that will need to be scheduled due to offices being closed today. Patient verbalized understanding regarding discharge instructions and follow up appointment needs. Patient transported to [facility] via wheelchair and transported home in personal vehicle. Discharge time [id number].

## 2017-12-28 NOTE — PROCEDURES
[address] [place] [address]                                  HOLTER MONITOR    PATIENT NAME: Jessi DOSS                      :        1932  MED REC NO:   [id number]                           ROOM:       [id number]  ACCOUNT NO:   [de-identified]                           ADMIT DATE: 12/15/2017  PROVIDER:     Salomón Sosa. AYDIN Maldonado [facility] STUDY:  2017    DURATION:  48 hours. QUALITY:  Reasonable. INDICATION:  Bradycardia. FINDINGS:  The patient is in normal sinus rhythm. Average heart rate of 70  beats per minute ranging from 50 to 120 beats per minute. Maximum R to R  interval is 1.7 seconds at 23 hours. There is 2180 ventricular ectopic  beat, accounting for 1% of the cardiac cycle of which 2083 isolated, 39  couplet, 3 ventricular bigeminy and 4 ventricular ectopic run. The longest  composed of 7 beats at the rate of 107 beats per minute. The fastest  composed of 4 beats at the rate of 210 beats per minute. There are 381 supraventricular ectopic beats of which 344 isolated, 11  couplets, 4 supraventricular bigeminy. There are 4 episodes of  supraventricular ectopic run. The longest composed of 6 beats at the rate  of 101 beats per minute. The fastest composed of 3 beats at the rate of  120 beats per minute. Diary presented and no entry noted because the  patient reported no symptoms. CONCLUSION:  1. This is a normal sinus rhythm with average heart rate of 70 beats per  minute ranging from 50 to 120 beats per minute. No significant pause of  more than 1.7 second noted. Frequent ventricular ectopic beat, total of  2180, predominantly isolated, few couplets and few ventricular bigeminy and  few nonsustained ventricular tachycardia.   2.  Occasional supraventricular ectopic beats, total of 381, predominantly  isolated, few couplets, few supraventricular bigeminy, few supraventricular  ectopic run. 3.  There is wide QRS complex suggesting underlying bundle branch block. 4.  No atrial fibrillation. No significant bradyarrhythmia to be  addressed. No diary was presented. No entry noted. Oral Efrain.  Cara Levi M.D.    D: 12/27/2017 20:30:43       T: 12/28/2017 2:17:17     [email or web address]  Job#: [id number]     Doc#: [id number]    CC:

## 2018-08-15 NOTE — PROGRESS NOTES
Dispense Refill    aspirin 81 MG tablet Take 81 mg by mouth daily      Acetaminophen (TYLENOL 8 HOUR ARTHRITIS PAIN PO) Take by mouth 2 times daily      CPAP Machine MISC by Does not apply route Please change CPAP pressure to 11 cm H20. Keep scheduled follow up with my clinic with down load before clinic visit. 1 each 0    CPAP Machine MISC by Does not apply route Please change his current CPAP therapy to Auto CPAP with minimum pressure of 6cm H20 and Maximum pressure of 20cm H20. Please do mask refit to avoid leaks and to get good seal. Provide chin strap in needed. 1 each 0    metoprolol succinate ER (TOPROL-XL) 25 MG XL tablet Take 25 mg by mouth daily      pantoprazole (PROTONIX) 40 MG tablet Take 40 mg by mouth daily      vitamin D 1000 UNITS CAPS Take 2,000 Units by mouth 2 times daily       POTASSIUM PO Take 550 mg by mouth 2 times daily       CPAP Machine MISC by Does not apply route Please change CPAP pressure to 12 cm H20. 1 each 0    isosorbide mononitrate (IMDUR) 30 MG CR tablet Take 60 mg by mouth daily       ascorbic acid (VITAMIN C) 500 MG tablet Take 500 mg by mouth daily.  atorvastatin (LIPITOR) 20 MG tablet Take 40 mg by mouth daily       b complex vitamins capsule Take 1 capsule by mouth daily.  bethanechol (URECHOLINE) 25 MG tablet   Take 50 mg by mouth 3 times daily       clopidogrel (PLAVIX) 75 MG tablet   Take 75 mg by mouth three times a week       ramipril (ALTACE) 2.5 MG capsule Take 5 mg by mouth daily       amLODIPine (NORVASC) 5 MG tablet Take by mouth daily       tamsulosin (FLOMAX) 0.4 MG capsule Take 0.4 mg by mouth 2 times daily       aspirin 325 MG EC tablet Take 1 tablet by mouth daily 30 tablet 3     No current facility-administered medications for this visit. No family history on file.        /78   Pulse 64   Temp 98.5 °F (36.9 °C)   Ht 5' 2\" (1.575 m)   Wt 167 lb (75.8 kg)   SpO2 96% Comment: room air at rest  BMI 30.54 kg/m²     BMI: Body mass index is 30.54 kg/m². Mallampati airway Class:4  Neck Circumference:.15.5 Inches  [place] sleepiness score 8/16/18: 6    Physical Exam :  Nursing note and vitals reviewed. Constitutional: Patient appears moderately built and moderately nourished. No distress. Patient is oriented to person, place, and time. HENT:   Head: Normocephalic and atraumatic. Right Ear: External ear normal. He uses hearing aid. Left Ear: External ear normal.  He uses hearing aid. Mouth/Throat: Oropharynx is clear and moist.  No oral thrush. Eyes: Conjunctivae are normal. Pupils are equal, round, and reactive to light. No scleral icterus. Neck: Neck supple. No JVD present. No tracheal deviation present. Cardiovascular: Normal rate, regular rhythm, normal heart sounds. No murmur heard. Pulmonary/Chest: Effort normal and breath sounds normal. No stridor. No respiratory distress. No wheezes. No rales. Patient exhibits no tenderness. Abdominal: Soft. Patient exhibits no distension. No tenderness. Musculoskeletal: Normal range of motion. Extremities: Patient exhibits no edema and no tenderness. Lymphadenopathy:  No cervical adenopathy. Neurological: Patient is alert and oriented to person, place, and time. Skin: Skin is warm and dry. Patient is not diaphoretic. Psychiatric: Patient  has a normal mood and affect. Patient behavior is normal.     Diagnostic Data:    CPAP retration study: 6/6/17                            Diagnostic Data:   PAP Download:   Recorded compliance dates:7/16/18-8/14/18  More than 4hour usage compliance was: 100%.   Average residual Apnea- Hypoapnea index on current pressue was:10.2.       PAP Type cpap   Level  11.0       Average usuage hours per day was:.[id number]         Interface: nasal pillows     Provider:  [][facility]                 [x][facility]                [][facility]  [][facility]                               [][facility]          []Other:     Average time in large leak per

## 2018-08-16 ENCOUNTER — OFFICE VISIT (OUTPATIENT)
Dept: PULMONOLOGY | Age: 83
End: 2018-08-16
Payer: MEDICARE

## 2018-08-16 VITALS
WEIGHT: 167 LBS | SYSTOLIC BLOOD PRESSURE: 128 MMHG | BODY MASS INDEX: 30.73 KG/M2 | DIASTOLIC BLOOD PRESSURE: 78 MMHG | HEART RATE: 64 BPM | HEIGHT: 62 IN | OXYGEN SATURATION: 96 % | TEMPERATURE: 98.5 F

## 2018-08-16 DIAGNOSIS — Z99.89 OSA ON CPAP: Primary | ICD-10-CM

## 2018-08-16 DIAGNOSIS — G47.33 OSA ON CPAP: Primary | ICD-10-CM

## 2018-08-16 PROCEDURE — 99213 OFFICE O/P EST LOW 20 MIN: CPT | Performed by: INTERNAL MEDICINE

## 2018-08-16 PROCEDURE — G8419 CALC BMI OUT NRM PARAM NOF/U: HCPCS | Performed by: INTERNAL MEDICINE

## 2018-08-16 PROCEDURE — G8598 ASA/ANTIPLAT THER USED: HCPCS | Performed by: INTERNAL MEDICINE

## 2018-08-16 PROCEDURE — 1123F ACP DISCUSS/DSCN MKR DOCD: CPT | Performed by: INTERNAL MEDICINE

## 2018-08-16 PROCEDURE — G8427 DOCREV CUR MEDS BY ELIG CLIN: HCPCS | Performed by: INTERNAL MEDICINE

## 2018-08-16 PROCEDURE — 1036F TOBACCO NON-USER: CPT | Performed by: INTERNAL MEDICINE

## 2018-08-16 PROCEDURE — 4040F PNEUMOC VAC/ADMIN/RCVD: CPT | Performed by: INTERNAL MEDICINE

## 2018-08-16 PROCEDURE — 1101F PT FALLS ASSESS-DOCD LE1/YR: CPT | Performed by: INTERNAL MEDICINE

## 2018-08-16 NOTE — PROGRESS NOTES
Chief Complaint: Christina Huertas is here for a 1 year f/u with a download aureliano    Mallampati airway Class:4  Neck Circumference:.15.5 Inches    [place] sleepiness score 8/16/18: 6      Diagnostic Data:   PAP Download:   Recorded compliance dates:7/16/18-8/14/18  More than 4hour usage compliance was: 100%. Average residual Apnea- Hypoapnea index on current pressue was:10.2.       PAP Type cpap   Level  11.0     Average usuage hours per day was:.[id number]     Interface: nasal pillows    Provider:  [][facility]  [x][facility]iano  [][facility]  [][facility]         [][facility] []Other:

## 2019-02-07 ENCOUNTER — OFFICE VISIT (OUTPATIENT)
Dept: PULMONOLOGY | Age: 84
End: 2019-02-07
Payer: MEDICARE

## 2019-02-07 VITALS
HEIGHT: 63 IN | DIASTOLIC BLOOD PRESSURE: 72 MMHG | OXYGEN SATURATION: 98 % | WEIGHT: 167 LBS | SYSTOLIC BLOOD PRESSURE: 134 MMHG | BODY MASS INDEX: 29.59 KG/M2 | HEART RATE: 67 BPM

## 2019-02-07 DIAGNOSIS — Z99.89 OSA ON CPAP: Primary | ICD-10-CM

## 2019-02-07 DIAGNOSIS — G47.33 OSA ON CPAP: Primary | ICD-10-CM

## 2019-02-07 PROCEDURE — G8598 ASA/ANTIPLAT THER USED: HCPCS | Performed by: INTERNAL MEDICINE

## 2019-02-07 PROCEDURE — 1123F ACP DISCUSS/DSCN MKR DOCD: CPT | Performed by: INTERNAL MEDICINE

## 2019-02-07 PROCEDURE — 4040F PNEUMOC VAC/ADMIN/RCVD: CPT | Performed by: INTERNAL MEDICINE

## 2019-02-07 PROCEDURE — G8427 DOCREV CUR MEDS BY ELIG CLIN: HCPCS | Performed by: INTERNAL MEDICINE

## 2019-02-07 PROCEDURE — 1036F TOBACCO NON-USER: CPT | Performed by: INTERNAL MEDICINE

## 2019-02-07 PROCEDURE — G8484 FLU IMMUNIZE NO ADMIN: HCPCS | Performed by: INTERNAL MEDICINE

## 2019-02-07 PROCEDURE — 1101F PT FALLS ASSESS-DOCD LE1/YR: CPT | Performed by: INTERNAL MEDICINE

## 2019-02-07 PROCEDURE — G8417 CALC BMI ABV UP PARAM F/U: HCPCS | Performed by: INTERNAL MEDICINE

## 2019-02-07 PROCEDURE — 99213 OFFICE O/P EST LOW 20 MIN: CPT | Performed by: INTERNAL MEDICINE

## 2020-01-26 NOTE — PROGRESS NOTES
[facility]  Sleep Medicine clinic  Follow up for Sleep Apnea    Tank Silveira        Chief Complaint: Mckenzie Dimas is here for a 1 year GOVIND follow up with a download. Chief complaint and [place]: Tank Silveira is a 80 y. o.oldmale came for follow up regarding his sleep apnea. He is currently using his positive airway pressure device with a CPAP pressure  of 13cm H20. His CPAP pressure was increased from 11 to 13cm H20 due to uncontrolled residual AHI in the past.  He denies any problems with machine or mask. He is sleeping well at night with out difficulty. He denies any daytime sleepiness. Review of Systems:   General/Constitutional: he gained 5lbs of weight from the last visit with normal appetite. No fever or chills. HENT: Negative. Decreased hearing sensation. Eyes: Negative. Upper respiratory tract: No nasal stuffiness or post nasal drip. Lower respiratory tract/ lungs: No cough or sputum production. No hemoptysis. Cardiovascular: No palpitations or chest pain. Gastrointestinal: No nausea or vomiting. Neurological: No focal neurologiacal weakness. Extremities: No edema. Musculoskeletal: No complaints. Genitourinary: No complaints. Hematological: Negative. Psychiatric/Behavioral: Negative. Skin: No itching.       Past Medical History:   Diagnosis Date    COPD (chronic obstructive pulmonary disease) ([address]     Hypertension     GOVIND on CPAP        Past Surgical History:   Procedure Laterality Date    CARPAL TUNNEL RELEASE      bilateral    CATARACT REMOVAL      PROSTATE SURGERY  2015    Laser    TOTAL KNEE ARTHROPLASTY      rt knee       Social History     Tobacco Use    Smoking status: Former Smoker     Years: 8.00     Types: Cigarettes     Last attempt to quit: 3/21/1958     Years since quittin.9    Smokeless tobacco: Former User     Types: Chew     Quit date: 3/21/2007    Tobacco comment: 1 pack every three to four days   Substance Use Topics    Alcohol use: No    Drug use: No       Allergies   Allergen Reactions    Pcn [Penicillins]        Current Outpatient Medications   Medication Sig Dispense Refill    aspirin 81 MG tablet Take 81 mg by mouth daily      Acetaminophen (TYLENOL 8 HOUR ARTHRITIS PAIN PO) Take by mouth 2 times daily      CPAP Machine MISC by Does not apply route Please increase his CPAP pressure to 13 cm H20 due to residual AHI of 10.2 on current CPAP pressure of 11cm H20. 1 each 0    CPAP Machine MISC by Does not apply route Please change CPAP pressure to 11 cm H20. Keep scheduled follow up with my clinic with down load before clinic visit. 1 each 0    CPAP Machine MISC by Does not apply route Please change his current CPAP therapy to Auto CPAP with minimum pressure of 6cm H20 and Maximum pressure of 20cm H20. Please do mask refit to avoid leaks and to get good seal. Provide chin strap in needed. 1 each 0    metoprolol succinate ER (TOPROL-XL) 25 MG XL tablet Take 25 mg by mouth daily      pantoprazole (PROTONIX) 40 MG tablet Take 40 mg by mouth daily      vitamin D 1000 UNITS CAPS Take 2,000 Units by mouth 2 times daily       POTASSIUM PO Take 550 mg by mouth 2 times daily       CPAP Machine MISC by Does not apply route Please change CPAP pressure to 12 cm H20. 1 each 0    isosorbide mononitrate (IMDUR) 30 MG CR tablet Take 60 mg by mouth daily       ascorbic acid (VITAMIN C) 500 MG tablet Take 500 mg by mouth daily.  atorvastatin (LIPITOR) 20 MG tablet Take 40 mg by mouth daily       b complex vitamins capsule Take 1 capsule by mouth daily.       bethanechol (URECHOLINE) 25 MG tablet   Take 50 mg by mouth 3 times daily       clopidogrel (PLAVIX) 75 MG tablet   Take 75 mg by mouth three times a week       ramipril (ALTACE) 2.5 MG capsule Take 2.5 mg by mouth daily       amLODIPine (NORVASC) 5 MG tablet Take by mouth daily       tamsulosin (FLOMAX) 0.4 MG capsule Take 0.4 mg by mouth 2 times daily compliance was:100%. Average residual Apnea- Hypoapnea index on current pressue was:7.1.       PAP Type CPAP    Level  13 cmH20      Average usuage hours per day was: 8:07       Interface: Nasal     Provider:  []?[facility]             [x]? [facility]                        []?[facility]          []? [facility]                           []?[facility]      []? Other:      95th percentile leak : 53.1L/min    Assesment:  -Mild obstructive sleep apnea on treatment with a CPAP pressure of 13cm H20 - He is using his CPAP machine with excellent compliance for >4hours. How ever he was still left with a significant residual AHI of 7.1on current pressure settings most likely due to significant leaks around his current mask. However he denies any clinical symptoms.    -Mild COPD. He remain asymptomatic. He refused any inhaler therapy. He verbalizes understanding.  -Hypertension under good control.        Recommendations/Plan:  -He was educated and advised to apply his current mask properly and tight enough to minimize leaks.  -He will be referred to [facility] [place] with a different style mask for better compliance and comfort.  -He was advised to continue current positive airway pressure therapy with above described pressure.  -He advised to keep good compliance with current recommended pressure to get optimal results and clinical improvement.  -Follow with my clinic in 4 to 6months for clinical reevaluation with review of download. -He was advised to call [employer] regarding supplies if needed. -He was advised to loose weight by controlling diet and doing exercise once cleared by his family physician.   -He was advised to call my office for earlier appointment if needed for worsening of sleep symptoms.  -Mandeep Garcia was educated about my impression and plan. Patient verbalizes understanding.

## 2020-02-13 ENCOUNTER — OFFICE VISIT (OUTPATIENT)
Dept: PULMONOLOGY | Age: 85
End: 2020-02-13
Payer: MEDICARE

## 2020-02-13 VITALS
WEIGHT: 172.8 LBS | OXYGEN SATURATION: 99 % | DIASTOLIC BLOOD PRESSURE: 72 MMHG | BODY MASS INDEX: 30.62 KG/M2 | HEART RATE: 61 BPM | HEIGHT: 63 IN | SYSTOLIC BLOOD PRESSURE: 128 MMHG

## 2020-02-13 PROCEDURE — G8427 DOCREV CUR MEDS BY ELIG CLIN: HCPCS | Performed by: INTERNAL MEDICINE

## 2020-02-13 PROCEDURE — 1036F TOBACCO NON-USER: CPT | Performed by: INTERNAL MEDICINE

## 2020-02-13 PROCEDURE — 1123F ACP DISCUSS/DSCN MKR DOCD: CPT | Performed by: INTERNAL MEDICINE

## 2020-02-13 PROCEDURE — G8484 FLU IMMUNIZE NO ADMIN: HCPCS | Performed by: INTERNAL MEDICINE

## 2020-02-13 PROCEDURE — 4040F PNEUMOC VAC/ADMIN/RCVD: CPT | Performed by: INTERNAL MEDICINE

## 2020-02-13 PROCEDURE — G8417 CALC BMI ABV UP PARAM F/U: HCPCS | Performed by: INTERNAL MEDICINE

## 2020-02-13 PROCEDURE — 99213 OFFICE O/P EST LOW 20 MIN: CPT | Performed by: INTERNAL MEDICINE

## 2020-08-12 ENCOUNTER — OFFICE VISIT (OUTPATIENT)
Dept: PULMONOLOGY | Age: 85
End: 2020-08-12
Payer: MEDICARE

## 2020-08-12 VITALS
SYSTOLIC BLOOD PRESSURE: 116 MMHG | OXYGEN SATURATION: 99 % | DIASTOLIC BLOOD PRESSURE: 62 MMHG | WEIGHT: 174.4 LBS | HEIGHT: 63 IN | BODY MASS INDEX: 30.9 KG/M2 | HEART RATE: 59 BPM

## 2020-08-12 PROCEDURE — G8427 DOCREV CUR MEDS BY ELIG CLIN: HCPCS | Performed by: NURSE PRACTITIONER

## 2020-08-12 PROCEDURE — 1036F TOBACCO NON-USER: CPT | Performed by: NURSE PRACTITIONER

## 2020-08-12 PROCEDURE — 1123F ACP DISCUSS/DSCN MKR DOCD: CPT | Performed by: NURSE PRACTITIONER

## 2020-08-12 PROCEDURE — 4040F PNEUMOC VAC/ADMIN/RCVD: CPT | Performed by: NURSE PRACTITIONER

## 2020-08-12 PROCEDURE — G8417 CALC BMI ABV UP PARAM F/U: HCPCS | Performed by: NURSE PRACTITIONER

## 2020-08-12 PROCEDURE — 99213 OFFICE O/P EST LOW 20 MIN: CPT | Performed by: NURSE PRACTITIONER

## 2020-08-12 NOTE — PROGRESS NOTES
[facility] and [address] [place] [address]         [id number]  2020   Chief Complaint   Patient presents with    Follow-up     GOVIND 6 month follow up with a download        Pt of Dr. Elvia Pascual    PAP Download:   Sissy Jurado or initial AHI: 6.8   Date of initial study: 08  Weight of initial study: 165  Re-titration 17 Weight 162 lbs  [x] Compliant  100%   [] Noncompliant 0%     PAP Type CPAp Level  13 cmH20   Avg Hrs/Day 8:28  AHI: 5.4   Recorded compliance dates, 20 to 8/10/20   Machine/Mfg: ResMed  Interface: nasal    Provider:  [][facility]  [x]Aureliano [][facility]  []Yasmin         [][facility] []Other:     Neck Size: 15.5  Mallampati Mallampati 4  ESS:  5    Here is a scan of the most recent download:                          Presentation:   Waleska Peters presents for sleep medicine follow up for obstructive sleep apnea. Since the last visit, Waleska Peters continues to do well with treatment, noting benefit. He has persistent elevation in AHI due to mask leakage. He is using a nasal mask and wakes with dry mouth. Equipment issues: The pressure is somewhat acceptable, the mask is unacceptable and he is using the humidity. Sleep issues:  Do you feel better? Yes  More rested? Yes   Better concentration? yes    Progress History:   Since last visit any new medical issues? No  New ER or hospitlal visits? No  Any new or changes in medicines? No  Any new sleep medicines?  No      Past Medical History:   Diagnosis Date    COPD (chronic obstructive pulmonary disease) ([address]     Hypertension     GOVIND on CPAP        Past Surgical History:   Procedure Laterality Date    CARPAL TUNNEL RELEASE      bilateral    CATARACT REMOVAL      PROSTATE SURGERY  2015    Laser    TOTAL KNEE ARTHROPLASTY      rt knee       Social History     Tobacco Use    Smoking status: Former Smoker     Years: 8.00     Types: Cigarettes     Last attempt to quit: 3/21/1958     Years since quittin.4    Smokeless tobacco: Former User     Types: Chew     Quit date: 3/21/2007    Tobacco comment: 1 pack every three to four days   Substance Use Topics    Alcohol use: No    Drug use: No       Allergies   Allergen Reactions    Pcn [Penicillins]        Current Outpatient Medications   Medication Sig Dispense Refill    aspirin 81 MG tablet Take 81 mg by mouth daily      Acetaminophen (TYLENOL 8 HOUR ARTHRITIS PAIN PO) Take by mouth 2 times daily      CPAP Machine MISC by Does not apply route Please increase his CPAP pressure to 13 cm H20 due to residual AHI of 10.2 on current CPAP pressure of 11cm H20. 1 each 0    CPAP Machine MISC by Does not apply route Please change CPAP pressure to 11 cm H20. Keep scheduled follow up with my clinic with down load before clinic visit. 1 each 0    CPAP Machine MISC by Does not apply route Please change his current CPAP therapy to Auto CPAP with minimum pressure of 6cm H20 and Maximum pressure of 20cm H20. Please do mask refit to avoid leaks and to get good seal. Provide chin strap in needed. 1 each 0    metoprolol succinate ER (TOPROL-XL) 25 MG XL tablet Take 25 mg by mouth daily      pantoprazole (PROTONIX) 40 MG tablet Take 40 mg by mouth daily      vitamin D 1000 UNITS CAPS Take 2,000 Units by mouth 2 times daily       POTASSIUM PO Take 550 mg by mouth 2 times daily       CPAP Machine MISC by Does not apply route Please change CPAP pressure to 12 cm H20. 1 each 0    isosorbide mononitrate (IMDUR) 30 MG CR tablet Take 60 mg by mouth daily       ascorbic acid (VITAMIN C) 500 MG tablet Take 500 mg by mouth daily.  atorvastatin (LIPITOR) 20 MG tablet Take 40 mg by mouth daily       b complex vitamins capsule Take 1 capsule by mouth daily.       bethanechol (URECHOLINE) 25 MG tablet   Take 50 mg by mouth 3 times daily       clopidogrel (PLAVIX) 75 MG tablet   Take 75 mg by mouth three times a week       ramipril (ALTACE) 2.5 MG capsule Take 2.5 mg by mouth daily       amLODIPine (NORVASC) 5 MG tablet Take by mouth daily       tamsulosin (FLOMAX) 0.4 MG capsule Take 0.4 mg by mouth 2 times daily        No current facility-administered medications for this visit. No family history on file. Review of Systems   General/Constitutional: No recent loss of weight or appetite changes. No fever or chills. HENT: Negative. Eyes: Negative. Upper respiratory tract: No nasal stuffiness or post nasal drip. Lower respiratory tract/ lungs: No cough or sputum production. No hemoptysis. Cardiovascular: No palpitations or chest pain. Gastrointestinal: No nausea or vomiting. Neurological: No focal neurologiacal weakness. Extremities: No edema. Musculoskeletal: No complaints. Genitourinary: No complaints. Hematological: Negative. Psychiatric/Behavioral: Negative. Skin: No itching. Physical Exam:    BMI: Body mass index is 30.89 kg/m². Wt Readings from Last 3 Encounters:   08/12/20 174 lb 6.4 oz (79.1 kg)   02/13/20 172 lb 12.8 oz (78.4 kg)   02/07/19 167 lb (75.8 kg)     Weight stable / unchanged since last seen (gain of 12 lbs since study)  Vitals: /62 (Site: Left Upper Arm, Position: Sitting, Cuff Size: Medium Adult)   Pulse 59   Ht 5' 3\" (1.6 m)   Wt 174 lb 6.4 oz (79.1 kg)   SpO2 99% Comment: on room air at rest  BMI 30.89 kg/m²         General Appearance - Moderately built, moderately nourished, in no acute distress. HEENT - Head is normocephalic, atraumatic. PERRL. Oral mucosa pink and moist, no oral thrush. Mallampati Score - IV (only hard palate visible). Neck - Supple, symmetrical, trachea midline and soft. Lungs - Clear to auscultation, no wheezes, rales or rhonchi, aeration good. Cardiovascular - Heart sounds are normal. Regular rhythm normal rate without murmur, gallop or rub. Abdomen - Soft, nontender, non-distended. Neurologic - Alert and oriented x 3. Skin - No bruising or bleeding.   Extremities - No cyanosis, clubbing or edema.      ASSESSMENT/DIAGNOSIS     Diagnosis Orders   1. GOVIND on CPAP  DME Order for [facility]) as OP          Plan   Do you need any equipment today? Yes Rx for chin strap. -Reviewed downloads with Asiya Faye and need for either FFM vs add chinstrap to improved leakage/AHI. -He just obtained new supplies and likes his current mask. Did not tolerate nasal pillows nor over the nose FFM style in the past, elects to add Emilia Red vs Respironics Premium chin strap. - He was advised to continue current positive airway pressure therapy with above described pressure. - He was advised to keep good compliance with current recommended pressure to get optimal results and clinical improvement.  - Recommend 7-9 hours of sleep with PAP treatment. - He was advised to call DME company regarding supplies if needed.   -He is to call my office for earlier appointment if needed for worsening of sleep symptoms.   - He was instructed on weight loss. - Asiya Faye was educated about my impression and plan and verbalizes understanding. We will see Ana Hawley back in: 3 months with download.     Electronically signed by JAYLAN Escalante CNP on 8/12/2020 at 11:13 AM

## 2020-11-18 ENCOUNTER — OFFICE VISIT (OUTPATIENT)
Dept: PULMONOLOGY | Age: 85
End: 2020-11-18
Payer: MEDICARE

## 2020-11-18 VITALS
DIASTOLIC BLOOD PRESSURE: 60 MMHG | HEART RATE: 55 BPM | WEIGHT: 178.2 LBS | SYSTOLIC BLOOD PRESSURE: 120 MMHG | HEIGHT: 63 IN | BODY MASS INDEX: 31.57 KG/M2 | OXYGEN SATURATION: 96 %

## 2020-11-18 PROCEDURE — 1123F ACP DISCUSS/DSCN MKR DOCD: CPT | Performed by: NURSE PRACTITIONER

## 2020-11-18 PROCEDURE — G8427 DOCREV CUR MEDS BY ELIG CLIN: HCPCS | Performed by: NURSE PRACTITIONER

## 2020-11-18 PROCEDURE — 4040F PNEUMOC VAC/ADMIN/RCVD: CPT | Performed by: NURSE PRACTITIONER

## 2020-11-18 PROCEDURE — G8484 FLU IMMUNIZE NO ADMIN: HCPCS | Performed by: NURSE PRACTITIONER

## 2020-11-18 PROCEDURE — 1036F TOBACCO NON-USER: CPT | Performed by: NURSE PRACTITIONER

## 2020-11-18 PROCEDURE — G8417 CALC BMI ABV UP PARAM F/U: HCPCS | Performed by: NURSE PRACTITIONER

## 2020-11-18 PROCEDURE — 99213 OFFICE O/P EST LOW 20 MIN: CPT | Performed by: NURSE PRACTITIONER

## 2020-11-18 NOTE — PROGRESS NOTES
[facility] and [address] [place] [address]         [id number]  11/18/2020   Chief Complaint   Patient presents with    Follow-up     GOVIND 3 month follow up with a download        Pt of Dr. Pan Lopez    PAP Download:   Tyrel Marin or initial AHI: 6.8   Date of initial study: 2/19/08  Weight of initial study: 165   Re-titration 6/6/17 Weight 162 lbs  [x] Compliant  100%   [] Noncompliant 0%     PAP Type CPAP Level  13 cmH20   Avg Hrs/Day 9:06  AHI: 5.6   Recorded compliance dates, 10/18/20 to 11/16/20   Machine/Mfg: ResMed  Interface: Nasal     Provider:  [][facility]  [x][facility] [][facility]  [][facility]         [][facility] []Other:     Neck Size: 15.5  Mallampati Mallampati 4  ESS: 9    SAQLI: 79    Here is a scan of the most recent download:                            Presentation:   Hua Ken presents for sleep medicine follow up for obstructive sleep apnea. Since the last visit, Hua Ken continues to do well with treatment, no complaints. Now using chinstrap with some improvement in leakage. Although improved, residual elevation in AHI when leak is controlled. Equipment issues: The pressure is somewhat acceptable, the mask is acceptable and he is using the humidity. Sleep issues:  Do you feel better? Yes  More rested? Yes   Better concentration? yes    Progress History:   Since last visit any new medical issues? No  New ER or hospitlal visits? No  Any new or changes in medicines? No  Any new sleep medicines?  No      Past Medical History:   Diagnosis Date    BPH (benign prostatic hyperplasia)     CAD (coronary artery disease)     COPD (chronic obstructive pulmonary disease) (HCC)     GERD (gastroesophageal reflux disease)     Hypertension     GOVIND on CPAP        Past Surgical History:   Procedure Laterality Date    CARPAL TUNNEL RELEASE      bilateral    CATARACT REMOVAL      CORONARY ANGIOPLASTY WITH STENT PLACEMENT  2020    PROSTATE SURGERY  2/2015    Laser    TOTAL KNEE ARTHROPLASTY      rt and back.  -Current AHI elevated with controlled leak, mixed central and obstructive events.  -Titration reviewed, no history of CHF, normal EF and no significant 02 concerns, will change to APAP 10-15.  -To call if not tolerated. -Monitor weight.    - He was advised to continue current positive airway pressure therapy with above described pressure. - He was advised to keep good compliance with current recommended pressure to get optimal results and clinical improvement.  - Recommend 7-9 hours of sleep with PAP treatment. - He was advised to call [employer] regarding supplies if needed.   -He is to call my office for earlier appointment if needed for worsening of sleep symptoms.   - He was instructed on weight loss. - Dat Palacios was educated about my impression and plan and verbalizes understanding. We will see Santana Thayer back in: 8 weeks with download.     Electronically signed by JAYLAN Boykin CNP on 11/18/2020 at 4:23 PM

## 2021-01-13 ENCOUNTER — OFFICE VISIT (OUTPATIENT)
Dept: PULMONOLOGY | Age: 86
End: 2021-01-13
Payer: MEDICARE

## 2021-01-13 VITALS
WEIGHT: 177.2 LBS | SYSTOLIC BLOOD PRESSURE: 118 MMHG | HEART RATE: 67 BPM | BODY MASS INDEX: 31.4 KG/M2 | TEMPERATURE: 97.1 F | DIASTOLIC BLOOD PRESSURE: 66 MMHG | OXYGEN SATURATION: 96 % | HEIGHT: 63 IN

## 2021-01-13 DIAGNOSIS — Z99.89 OSA ON CPAP: Primary | ICD-10-CM

## 2021-01-13 DIAGNOSIS — G47.33 OSA ON CPAP: Primary | ICD-10-CM

## 2021-01-13 PROCEDURE — 4040F PNEUMOC VAC/ADMIN/RCVD: CPT | Performed by: NURSE PRACTITIONER

## 2021-01-13 PROCEDURE — G8484 FLU IMMUNIZE NO ADMIN: HCPCS | Performed by: NURSE PRACTITIONER

## 2021-01-13 PROCEDURE — G8427 DOCREV CUR MEDS BY ELIG CLIN: HCPCS | Performed by: NURSE PRACTITIONER

## 2021-01-13 PROCEDURE — 1123F ACP DISCUSS/DSCN MKR DOCD: CPT | Performed by: NURSE PRACTITIONER

## 2021-01-13 PROCEDURE — G8417 CALC BMI ABV UP PARAM F/U: HCPCS | Performed by: NURSE PRACTITIONER

## 2021-01-13 PROCEDURE — 99212 OFFICE O/P EST SF 10 MIN: CPT | Performed by: NURSE PRACTITIONER

## 2021-01-13 PROCEDURE — 1036F TOBACCO NON-USER: CPT | Performed by: NURSE PRACTITIONER

## 2021-01-13 RX ORDER — NITROGLYCERIN 0.4 MG/1
0.4 TABLET SUBLINGUAL EVERY 5 MIN PRN
COMMUNITY

## 2021-01-13 NOTE — PROGRESS NOTES
[facility] and [address] [place] [address]         [id number]  1/13/2021   Chief Complaint   Patient presents with    Follow-up     GOVIND 8 week sleep folllow up  with Kaci Moody download        Pt of Dr. Reed Salgado    PAP Download:   Original or initial AHI: 6.8   Date of initial study: 2/19/08  Weight of initial study: 165 lb  Re-titration 6/6/17 Weight 162 lbs  [x] Compliant  100%   [] Noncompliant 0%     PAP Type airsense  10 Level  10/15 cmh2o    Avg Hrs/Day 9:10  AHI: 6.4   Recorded compliance dates, 12/12/20-1/10/21  Machine/Mfg: resmed  Interface: nasal    Provider:  []-HME  [x]Aureliano [][facility]  [][facility]         [][facility] []Other:     Neck Size: 15.5  Mallampati Mallampati 4  ESS:  3  SAQLI: 85    Here is a scan of the most recent download:                            Presentation:   Carlota Barrera presents for sleep medicine follow up for obstructive sleep apnea. Since the last visit, Carlota Barrera continues to do well with treatment, no complaints. He had issues with leakage that has improved with adding chin strap but persistent residual elevation in AHI. Changed from CPAP 13 to APAP 10-15 last visit. Had some increased central events with higher pressure during titration. Reports he feels he is sleeping better. His weight is up 15 lbs since had last study. Equipment issues: The pressure is somewhat acceptable, the mask is acceptable and he is using the humidity. Sleep issues:  Do you feel better? Yes  More rested? Yes   Better concentration? yes    Progress History:   Since last visit any new medical issues? No  New ER or hospitlal visits? No  Any new or changes in medicines? No  Any new sleep medicines?  No      Past Medical History:   Diagnosis Date    BPH (benign prostatic hyperplasia)     CAD (coronary artery disease)     COPD (chronic obstructive pulmonary disease) (HCC)     GERD (gastroesophageal reflux disease)     Hypertension     GOVIND on CPAP        Past Surgical History: Procedure Laterality Date    CARPAL TUNNEL RELEASE      bilateral    CATARACT REMOVAL      CORONARY ANGIOPLASTY WITH STENT PLACEMENT      PROSTATE SURGERY  2015    Laser    TOTAL KNEE ARTHROPLASTY      rt knee       Social History     Tobacco Use    Smoking status: Former Smoker     Years: 8.00     Types: Cigarettes     Quit date: 3/21/1958     Years since quittin.8    Smokeless tobacco: Former User     Types: Chew     Quit date: 3/21/2007    Tobacco comment: 1 pack every three to four days   Substance Use Topics    Alcohol use: No    Drug use: No       Allergies   Allergen Reactions    Pcn [Penicillins]        Current Outpatient Medications   Medication Sig Dispense Refill    nitroGLYCERIN (NITROSTAT) 0.4 MG SL tablet Place 0.4 mg under the tongue every 5 minutes as needed for Chest pain up to max of 3 total doses. If no relief after 1 dose, call 911.  CPAP Machine MISC by Does not apply route Please change APAP pressure to 10 to 16 cm H20. 1 each 0    aspirin 81 MG tablet Take 81 mg by mouth daily      Acetaminophen (TYLENOL 8 HOUR ARTHRITIS PAIN PO) Take by mouth 2 times daily      metoprolol succinate ER (TOPROL-XL) 25 MG XL tablet Take 25 mg by mouth daily      pantoprazole (PROTONIX) 40 MG tablet Take 40 mg by mouth daily      vitamin D 1000 UNITS CAPS Take 2,000 Units by mouth 2 times daily       POTASSIUM PO Take 550 mg by mouth 2 times daily       isosorbide mononitrate (IMDUR) 30 MG CR tablet Take 60 mg by mouth daily       ascorbic acid (VITAMIN C) 500 MG tablet Take 500 mg by mouth daily.  atorvastatin (LIPITOR) 20 MG tablet Take 40 mg by mouth daily       b complex vitamins capsule Take 1 capsule by mouth daily.       bethanechol (URECHOLINE) 25 MG tablet   Take 50 mg by mouth 3 times daily       clopidogrel (PLAVIX) 75 MG tablet Take 75 mg by mouth daily       ramipril (ALTACE) 2.5 MG capsule Take 2.5 mg by mouth daily  amLODIPine (NORVASC) 5 MG tablet Take by mouth daily       tamsulosin (FLOMAX) 0.4 MG capsule Take 0.4 mg by mouth 2 times daily        No current facility-administered medications for this visit. History reviewed. No pertinent family history. Review of Systems   General/Constitutional: No recent loss of weight or appetite changes. No fever or chills. HENT: Negative. Eyes: Negative. Upper respiratory tract: No nasal stuffiness or post nasal drip. Lower respiratory tract/ lungs: No cough or sputum production. No hemoptysis. Cardiovascular: No palpitations or chest pain. Gastrointestinal: No nausea or vomiting. Neurological: No focal neurologiacal weakness. Extremities: No edema. Musculoskeletal: No complaints. Genitourinary: No complaints. Hematological: Negative. Psychiatric/Behavioral: Negative. Skin: No itching. Physical Exam:    BMI: Body mass index is 31.39 kg/m². Wt Readings from Last 3 Encounters:   01/13/21 177 lb 3.2 oz (80.4 kg)   11/18/20 178 lb 3.2 oz (80.8 kg)   08/12/20 174 lb 6.4 oz (79.1 kg)     Weight stable / unchanged  Vitals: /66 (Site: Left Upper Arm, Position: Sitting, Cuff Size: Medium Adult)   Pulse 67   Temp 97.1 °F (36.2 °C)   Ht 5' 3\" (1.6 m)   Wt 177 lb 3.2 oz (80.4 kg)   SpO2 96% Comment: on ra  BMI 31.39 kg/m²         General Appearance - Moderately built, moderately nourished, in no acute distress. HEENT - Head is normocephalic, atraumatic. PERRL. Oral mucosa pink and moist, no oral thrush. Mallampati Score - IV (only hard palate visible). Neck - Supple, symmetrical, trachea midline and soft. Lungs - Clear to auscultation, no wheezes, rales or rhonchi, aeration good. Cardiovascular - Heart sounds are normal. Regular rhythm normal rate without murmur, gallop or rub. Abdomen - Soft, nontender, non-distended. Neurologic - Alert and oriented x 3. Skin - No bruising or bleeding. Extremities - No cyanosis, clubbing or edema. ASSESSMENT/DIAGNOSIS     Diagnosis Orders   1. GOVIND on CPAP              Plan   Do you need any equipment today? No.  -Download reviewed. Residual elevation in AHI, primarily obstructive (when leak is controlled). -Near max pressure so will increase to APAP 10 to 16. Call with concerns. - He was advised to continue current positive airway pressure therapy with above described pressure. - He was advised to keep good compliance with current recommended pressure to get optimal results and clinical improvement.  - Recommend 7-9 hours of sleep with PAP treatment. - He was advised to call [employer]) regarding supplies if needed.   -He is to call my office for earlier appointment if needed for worsening of sleep symptoms.   - He was instructed on weight loss. - Rosa M Medina was educated about my impression and plan and verbalizes understanding. We will see Mook Phelan back in: 6 months with download.     JAYLAN Reddy - CNP  1/13/2021 12:42 PM

## 2021-01-28 ENCOUNTER — CLINICAL DOCUMENTATION (OUTPATIENT)
Dept: PULMONOLOGY | Age: 86
End: 2021-01-28

## 2021-01-28 NOTE — PROGRESS NOTES
Download reviewed with changing to APAP 10-16 with continued elevation in AHI (even with controlled leak) and mixed obstructive/central. Narrow window to 11 to 15.      Electronically signed by JAYLAN Mcgraw CNP on 1/28/2021 at 4:39 PM

## 2021-07-14 ENCOUNTER — OFFICE VISIT (OUTPATIENT)
Dept: PULMONOLOGY | Age: 86
End: 2021-07-14
Payer: MEDICARE

## 2021-07-14 VITALS
DIASTOLIC BLOOD PRESSURE: 66 MMHG | HEIGHT: 63 IN | WEIGHT: 175 LBS | BODY MASS INDEX: 31.01 KG/M2 | HEART RATE: 69 BPM | SYSTOLIC BLOOD PRESSURE: 120 MMHG | OXYGEN SATURATION: 98 % | TEMPERATURE: 96.9 F

## 2021-07-14 DIAGNOSIS — Z99.89 OSA ON CPAP: Primary | ICD-10-CM

## 2021-07-14 DIAGNOSIS — G47.33 OSA ON CPAP: Primary | ICD-10-CM

## 2021-07-14 PROCEDURE — G8427 DOCREV CUR MEDS BY ELIG CLIN: HCPCS | Performed by: NURSE PRACTITIONER

## 2021-07-14 PROCEDURE — G8417 CALC BMI ABV UP PARAM F/U: HCPCS | Performed by: NURSE PRACTITIONER

## 2021-07-14 PROCEDURE — 4040F PNEUMOC VAC/ADMIN/RCVD: CPT | Performed by: NURSE PRACTITIONER

## 2021-07-14 PROCEDURE — 99213 OFFICE O/P EST LOW 20 MIN: CPT | Performed by: NURSE PRACTITIONER

## 2021-07-14 PROCEDURE — 1036F TOBACCO NON-USER: CPT | Performed by: NURSE PRACTITIONER

## 2021-07-14 PROCEDURE — 1123F ACP DISCUSS/DSCN MKR DOCD: CPT | Performed by: NURSE PRACTITIONER

## 2021-07-14 ASSESSMENT — ENCOUNTER SYMPTOMS
VOMITING: 0
WHEEZING: 0
COUGH: 0
EYES NEGATIVE: 1
SHORTNESS OF BREATH: 0
NAUSEA: 0
DIARRHEA: 0
ABDOMINAL PAIN: 0

## 2021-07-14 NOTE — PROGRESS NOTES
[facility]      Otilio Corley         [id number]  7/14/2021   Chief Complaint   Patient presents with    Follow-up     GOVIND 6 month sleep follow up with Aureliano WILDER        Pt of Dr. Mercy MARTIN Download:   Original or initial AHI: 6.8     Date of initial study: 2/19/2008      Compliant  100%     Noncompliant 0 %     PAP Type airsense 10 Level  11/15 cmh2o    Avg Hrs/Day 8:20  AHI: 5.7   Recorded compliance dates , 6/12/21-7/11/21  Machine/Mfg:   [x] ResMed    [] Respironics/Dreamstation   Interface:   [x] Nasal    [] Nasal pillows   [] FFM      Provider:      [] -MORGAN     [x]Aureliano     [] Yuri    [] Alyse Lund    [] [facility]               [] [facility]      [] [employer]    [] [address]      [] Other    Neck Size: 15.5  Mallampati Mallampati 4  ESS:  8  SAQLI:  83    Here is a scan of the most recent download:                    Presentation:   Xavier Mcintosh presents for sleep medicine follow up for obstructive sleep apnea  Since the last visit, Xavier Mcintosh has been using his CPAP compliantly. Needing script for new chin strap, current one is getting stretched out and noticing leaks. AHI elevated last download, due to central apneas. His pressure was narrowed and kept on APAP  He is feeling good. , AHI is better   Likes nasal mask and works well with chin strap   Takes 2 Tylenol PM nightly before, mostly for OA pain. Is helpful   Denies any complaints today     Equipment issues: The pressure is  acceptable, the mask is acceptable     Sleep issues:  Do you feel better? Yes  More rested? Yes   Better concentration? yes    Progress History:   Since last visit any new medical issues? No  New ER or hospital visits? No  Any new or changes in medicines? No  Any new sleep medicines? No    Review of Systems -   Review of Systems   Constitutional: Negative for activity change, appetite change, chills, fatigue and fever. HENT: Negative. Eyes: Negative.     Respiratory: Negative for cough, shortness of breath and wheezing. Cardiovascular: Negative for chest pain, palpitations and leg swelling. Gastrointestinal: Negative for abdominal pain, diarrhea, nausea and vomiting. Genitourinary: Negative. Musculoskeletal: Positive for arthralgias. Skin: Negative. Neurological: Negative. Hematological: Does not bruise/bleed easily. Psychiatric/Behavioral: Negative for suicidal ideas. Physical Exam:    BMI:  Body mass index is 31 kg/m². Wt Readings from Last 3 Encounters:   07/14/21 175 lb (79.4 kg)   01/13/21 177 lb 3.2 oz (80.4 kg)   11/18/20 178 lb 3.2 oz (80.8 kg)     Weight stable / unchanged  Vitals: /66 (Site: Left Upper Arm, Position: Sitting)   Pulse 69   Temp 96.9 °F (36.1 °C)   Ht 5' 3\" (1.6 m)   Wt 175 lb (79.4 kg)   SpO2 98% Comment: on ra  BMI 31.00 kg/m²       Physical Exam  Constitutional:       Appearance: Normal appearance. HENT:      Head: Normocephalic and atraumatic. Eyes:      Conjunctiva/sclera: Conjunctivae normal.   Pulmonary:      Effort: No tachypnea, bradypnea or respiratory distress. Neurological:      Mental Status: He is alert and oriented to person, place, and time. Psychiatric:         Attention and Perception: Attention normal.         Mood and Affect: Mood normal.         Speech: Speech normal.         Behavior: Behavior normal.         Thought Content: Thought content normal.         Cognition and Memory: Cognition normal.         Judgment: Judgment normal.           ASSESSMENT/DIAGNOSIS     Diagnosis Orders   1. GOVIND on CPAP  DME Order for CPAP as OP    DME Order for CPAP as OP            Plan   Do you need any equipment today? Yes - new chin strap     - Download reviewed and discussed with patient  - He  was advised to continue current positive airway pressure therapy with above described pressure. Although AHI still slightly elevated, he feels good and it is improved.   May be better once gets better seal with new chin ctilalli .     - He  advised to keep good compliance with current recommended pressure to get optimal results and clinical improvement  - Recommend 7-9 hours of sleep with PAP  - He was advised to call [employer] company regarding supplies if needed.   -He call my office for earlier appointment if needed for worsening of sleep symptoms.   - He was instructed on weight loss  - Sera Humphrey was educated about my impression and plan. Patient verbalizesunderstanding.     We will see Hyacinth Blackburn back in: 1 year with download    Information added by my medical assistant/LPN was reviewed today

## 2022-07-20 ENCOUNTER — OFFICE VISIT (OUTPATIENT)
Dept: PULMONOLOGY | Age: 87
End: 2022-07-20
Payer: MEDICARE

## 2022-07-20 VITALS
HEIGHT: 63 IN | WEIGHT: 176.8 LBS | SYSTOLIC BLOOD PRESSURE: 122 MMHG | DIASTOLIC BLOOD PRESSURE: 70 MMHG | TEMPERATURE: 97.2 F | BODY MASS INDEX: 31.33 KG/M2 | HEART RATE: 71 BPM | OXYGEN SATURATION: 96 %

## 2022-07-20 DIAGNOSIS — Z99.89 OSA ON CPAP: Primary | ICD-10-CM

## 2022-07-20 DIAGNOSIS — G47.33 OSA ON CPAP: Primary | ICD-10-CM

## 2022-07-20 PROCEDURE — 1036F TOBACCO NON-USER: CPT | Performed by: NURSE PRACTITIONER

## 2022-07-20 PROCEDURE — G8427 DOCREV CUR MEDS BY ELIG CLIN: HCPCS | Performed by: NURSE PRACTITIONER

## 2022-07-20 PROCEDURE — G8417 CALC BMI ABV UP PARAM F/U: HCPCS | Performed by: NURSE PRACTITIONER

## 2022-07-20 PROCEDURE — 99213 OFFICE O/P EST LOW 20 MIN: CPT | Performed by: NURSE PRACTITIONER

## 2022-07-20 PROCEDURE — 1123F ACP DISCUSS/DSCN MKR DOCD: CPT | Performed by: NURSE PRACTITIONER

## 2022-07-20 ASSESSMENT — ENCOUNTER SYMPTOMS
WHEEZING: 0
NAUSEA: 0
EYES NEGATIVE: 1
ABDOMINAL PAIN: 0
COUGH: 0
DIARRHEA: 0
RESPIRATORY NEGATIVE: 1
SHORTNESS OF BREATH: 0
ALLERGIC/IMMUNOLOGIC NEGATIVE: 1
VOMITING: 0

## 2022-07-20 NOTE — PROGRESS NOTES
[facility]      Kip Berman         [id number]  7/20/2022   Chief Complaint   Patient presents with    Follow-up     1 year GOVIND f/u w DL         Pt of Dr. Gely MARTIN Download:   Original or initial AHI: 6.8     Date of initial study: 2/19/2008  weight at time of initial Study N/A    Compliant  100%     Noncompliant 0 %     PAP Type Airsense 10 autoset Level  11-15   Avg Hrs/Day 8 hrs 14 mins  AHI: 4.9   Recorded compliance dates , 6/2022-7/19/22  Machine/Mfg:   [x] ResMed    [] Respironics/Dreamstation   Interface:   [x] Nasal    [] Nasal pillows   [] FFM      Provider:      [] -MORGAN     [x]Aureliano     [] Yuri    [] Ascencion Carbajal    [] [place]ans               [] [facility]      [] [facility]    [] [address]      [] Other    Neck Size: 15.5  Mallampati Mallampati 4  ESS:  8  SAQLI: 72    Here is a scan of the most recent download:              Presentation:   [facility]  Since the last visit, Nanda Navarrete is wearing his CPAP 100% compliant and feels benefit with use. Wakes up with dry mouth . Is using chin strap with his nasal mask , could not find FFM in past that fit well       Equipment issues: The pressure is  acceptable, the mask is acceptable     Progress History:   Since last visit any new medical issues? No  Sleep Related Issues? No  New ER or hospital visits? No  Any new or changes in medicines? No  Any new sleep medicines? No    Review of Systems -   Review of Systems   Constitutional: Negative. Negative for activity change, appetite change, chills, fatigue, fever and unexpected weight change. HENT: Negative. Negative for postnasal drip. Dry mouth    Eyes: Negative. Respiratory: Negative. Negative for cough, shortness of breath and wheezing. Cardiovascular: Negative. Negative for chest pain, palpitations and leg swelling.    Gastrointestinal:  Negative for abdominal pain, diarrhea, nausea and vomiting. Genitourinary: Negative. Musculoskeletal: Negative. Skin: Negative. Allergic/Immunologic: Negative. Neurological: Negative. Hematological: Negative. Psychiatric/Behavioral: Negative. Negative for sleep disturbance. Physical Exam:    BMI:  Body mass index is 31.32 kg/m². Wt Readings from Last 3 Encounters:   07/20/22 176 lb 12.8 oz (80.2 kg)   07/14/21 175 lb (79.4 kg)   01/13/21 177 lb 3.2 oz (80.4 kg)     Weight stable / unchanged  Vitals: /70 (Site: Left Upper Arm, Position: Sitting, Cuff Size: Medium Adult)   Pulse 71   Temp 97.2 °F (36.2 °C) (Temporal)   Ht 5' 3\" (1.6 m)   Wt 176 lb 12.8 oz (80.2 kg)   SpO2 96% Comment: on RA  BMI 31.32 kg/m²       Physical Exam  Vitals and nursing note reviewed. Constitutional:       Appearance: Normal appearance. He is overweight. HENT:      Head: Normocephalic and atraumatic. Mouth/Throat:      Pharynx: Oropharynx is clear. Eyes:      Conjunctiva/sclera: Conjunctivae normal.   Pulmonary:      Effort: Pulmonary effort is normal. No tachypnea, bradypnea or respiratory distress. Skin:     Findings: No erythema or rash. Neurological:      Mental Status: He is alert and oriented to person, place, and time. Psychiatric:         Attention and Perception: Attention normal.         Mood and Affect: Mood normal.         Speech: Speech normal.         Behavior: Behavior normal.         Thought Content: Thought content normal.         Cognition and Memory: Cognition normal.         Judgment: Judgment normal.         ASSESSMENT/DIAGNOSIS     Diagnosis Orders   1. GOVIND on CPAP  DME Order for CPAP as OP               Plan   Do you need any equipment today? Yes -printed rx for supplies, faxed to patient's DME     - Download reviewed and discussed with patient  - He  was advised to continue current positive airway pressure therapy with above described pressure.    - He  advised to keep good compliance with current recommended pressure to get optimal results and clinical improvement  - Recommend 7-9 hours of sleep with PAP  - He was advised to call [employer] company regarding supplies if needed.   -He call my office for earlier appointment if needed for worsening of sleep symptoms.   - He was instructed on weight loss  - [facility] was educated about my impression and plan. Patient verbalizesunderstanding.   We will see Teresita Hou back in: 1 year with download    Information added by my medical assistant/LPN was reviewed today    billing based on medical decision making     JAYLAN Whitehead-CNP   7/20/2022

## 2023-07-25 NOTE — PROGRESS NOTES
visit:    GOVIND on CPAP- controlled   - Download reviewed and discussed with patient  - He  was advised to continue current positive airway pressure therapy with above described pressure. - He  advised to keep good compliance with current recommended pressure to get optimal results and clinical improvement  - Recommend 7-9 hours of sleep with PAP  - He was advised to call DME company regarding supplies if needed.   -He call my office for earlier appointment if needed for worsening of sleep symptoms.     -     DME Order for CPAP as OP for supplies     Class 1 obesity due to excess calories with serious comorbidity and body mass index (BMI) of 31.0 to 31.9 in adult-stable   Pt counseled on obesity, health risks associated with obesity and counseled on need for weight reduction. Coronary artery disease involving native coronary artery of native heart without angina pectoris- stable   S/p stents   S/p pacemaker  Follows with cardiology for management. Patient verbalizes understanding.   We will see Kailey Dennison back in: 1 year with download    Information added by my medical assistant/LPN was reviewed today    billing based on medical decision making     JAYLAN Leach-CNP   7/26/2023

## 2023-07-26 ENCOUNTER — OFFICE VISIT (OUTPATIENT)
Dept: PULMONOLOGY | Age: 88
End: 2023-07-26
Payer: MEDICARE

## 2023-07-26 VITALS
OXYGEN SATURATION: 98 % | SYSTOLIC BLOOD PRESSURE: 114 MMHG | HEART RATE: 64 BPM | HEIGHT: 63 IN | TEMPERATURE: 97.9 F | DIASTOLIC BLOOD PRESSURE: 64 MMHG | BODY MASS INDEX: 31.15 KG/M2 | WEIGHT: 175.8 LBS

## 2023-07-26 DIAGNOSIS — E66.09 CLASS 1 OBESITY DUE TO EXCESS CALORIES WITH SERIOUS COMORBIDITY AND BODY MASS INDEX (BMI) OF 31.0 TO 31.9 IN ADULT: ICD-10-CM

## 2023-07-26 DIAGNOSIS — G47.33 OSA ON CPAP: Primary | ICD-10-CM

## 2023-07-26 DIAGNOSIS — I25.10 CORONARY ARTERY DISEASE INVOLVING NATIVE CORONARY ARTERY OF NATIVE HEART WITHOUT ANGINA PECTORIS: ICD-10-CM

## 2023-07-26 DIAGNOSIS — Z99.89 OSA ON CPAP: Primary | ICD-10-CM

## 2023-07-26 PROBLEM — I35.1 AORTIC VALVE REGURGITATION: Status: ACTIVE | Noted: 2023-07-26

## 2023-07-26 PROBLEM — F41.9 ANXIETY: Status: ACTIVE | Noted: 2023-07-26

## 2023-07-26 PROBLEM — N40.0 BENIGN PROSTATIC HYPERPLASIA: Status: ACTIVE | Noted: 2023-07-26

## 2023-07-26 PROBLEM — J31.0 CHRONIC RHINITIS: Status: ACTIVE | Noted: 2023-07-26

## 2023-07-26 PROBLEM — D64.9 ANEMIA: Status: ACTIVE | Noted: 2023-07-26

## 2023-07-26 PROBLEM — I50.32 CHRONIC DIASTOLIC HEART FAILURE (HCC): Status: ACTIVE | Noted: 2023-07-26

## 2023-07-26 PROCEDURE — 99214 OFFICE O/P EST MOD 30 MIN: CPT | Performed by: NURSE PRACTITIONER

## 2023-07-26 PROCEDURE — 1123F ACP DISCUSS/DSCN MKR DOCD: CPT | Performed by: NURSE PRACTITIONER

## 2023-07-26 PROCEDURE — G8427 DOCREV CUR MEDS BY ELIG CLIN: HCPCS | Performed by: NURSE PRACTITIONER

## 2023-07-26 PROCEDURE — 1036F TOBACCO NON-USER: CPT | Performed by: NURSE PRACTITIONER

## 2023-07-26 PROCEDURE — G8417 CALC BMI ABV UP PARAM F/U: HCPCS | Performed by: NURSE PRACTITIONER

## 2023-07-26 ASSESSMENT — ENCOUNTER SYMPTOMS
WHEEZING: 0
VOMITING: 0
SHORTNESS OF BREATH: 0
COUGH: 0
EYES NEGATIVE: 1
DIARRHEA: 0
ABDOMINAL PAIN: 0
NAUSEA: 0

## 2024-02-28 ENCOUNTER — OFFICE VISIT (OUTPATIENT)
Dept: PULMONOLOGY | Age: 89
End: 2024-02-28

## 2024-02-28 ENCOUNTER — HOSPITAL ENCOUNTER (OUTPATIENT)
Dept: GENERAL RADIOLOGY | Age: 89
Discharge: HOME | End: 2024-02-28
Attending: RADIOLOGY

## 2024-02-28 VITALS
WEIGHT: 171 LBS | TEMPERATURE: 97.8 F | DIASTOLIC BLOOD PRESSURE: 68 MMHG | SYSTOLIC BLOOD PRESSURE: 118 MMHG | BODY MASS INDEX: 30.3 KG/M2 | OXYGEN SATURATION: 97 % | HEART RATE: 64 BPM | HEIGHT: 63 IN

## 2024-02-28 DIAGNOSIS — R06.02 SOB (SHORTNESS OF BREATH) ON EXERTION: ICD-10-CM

## 2024-02-28 DIAGNOSIS — I25.10 CORONARY ARTERY DISEASE INVOLVING NATIVE CORONARY ARTERY OF NATIVE HEART WITHOUT ANGINA PECTORIS: ICD-10-CM

## 2024-02-28 DIAGNOSIS — J44.9 MODERATE COPD (CHRONIC OBSTRUCTIVE PULMONARY DISEASE) (HCC): Primary | ICD-10-CM

## 2024-02-28 DIAGNOSIS — Z00.6 EXAM FOR CLINICAL RESEARCH: ICD-10-CM

## 2024-02-28 DIAGNOSIS — E66.09 CLASS 1 OBESITY DUE TO EXCESS CALORIES WITH SERIOUS COMORBIDITY AND BODY MASS INDEX (BMI) OF 31.0 TO 31.9 IN ADULT: ICD-10-CM

## 2024-02-28 RX ORDER — SOTALOL HYDROCHLORIDE 80 MG/1
80 TABLET ORAL 2 TIMES DAILY
COMMUNITY

## 2024-02-28 RX ORDER — IPRATROPIUM BROMIDE AND ALBUTEROL SULFATE 2.5; .5 MG/3ML; MG/3ML
SOLUTION RESPIRATORY (INHALATION)
Qty: 180 ML | Refills: 5 | Status: SHIPPED | OUTPATIENT
Start: 2024-02-28

## 2024-02-28 RX ORDER — ACETAMINOPHEN/DIPHENHYDRAMINE 500MG-25MG
1 TABLET ORAL NIGHTLY PRN
COMMUNITY

## 2024-02-28 ASSESSMENT — ENCOUNTER SYMPTOMS
WHEEZING: 1
SHORTNESS OF BREATH: 1

## 2024-02-28 NOTE — PROGRESS NOTES
[facility]    Patient: NADIA JIMENEZ, 89 y.o.   : 1932      New Pulmonary outpatient Consult      Subjective     Chief Complaint   Patient presents with    New Patient     Pulmonary consult for JONES/abnormal PFT, referred by Dr Alfie Aranda, PFT 24, XR 23.        NEHA Hooks is here for evaluation of shortness of breath with referral from Dr Aranda .  Several years ago was told he had COPD just based on symptoms, tried inhalers- never helped     Symptoms: \" I get winded easily\" , wheezing with activity , occasional chest tightness- s/p cardiac stent in 2024  helped some of the tightness and SOB   Has pacemaker for AFIB -   Chronic rhinorrhea, denies seasonal allergies.     Feels he has been SOB \" nearly all my life\" , when he tried to run as a child would get out of breathe   Has gradually getting worse in his older age.   Using CPAP at night.   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   Still has some chickens  Does not have cats or dogs.   Was in the [employer] for 2 years, otherwise lived in [place] all his life     Recommended Medicare Vaccines completed:     Personal History : DVT/PE no  Family History: denies family history of pulmonary disease   Past Medical hx   PMH:  Past Medical History:   Diagnosis Date    BPH (benign prostatic hyperplasia)     CAD (coronary artery disease)     COPD (chronic obstructive pulmonary disease) (HCC)     GERD (gastroesophageal reflux disease)     Hypertension     GOVIND on CPAP      SURGICAL HISTORY:  Past Surgical History:   Procedure Laterality Date    CARDIAC CATHETERIZATION  2024    with stent    CARPAL TUNNEL RELEASE      bilateral    CATARACT REMOVAL      CORONARY ANGIOPLASTY WITH STENT PLACEMENT      PROSTATE SURGERY  2015

## 2024-02-29 ENCOUNTER — TELEPHONE (OUTPATIENT)
Dept: PULMONOLOGY | Age: 89
End: 2024-02-29

## 2024-02-29 NOTE — TELEPHONE ENCOUNTER
Pt called in requesting an order for a nebulizer. He stated that at his appt they told you they thought they had one at home, they checked and they do not. Could you write them an order for a nebulizer? Please advise, thank you.

## 2024-04-24 ENCOUNTER — OFFICE VISIT (OUTPATIENT)
Dept: PULMONOLOGY | Age: 89
End: 2024-04-24
Payer: MEDICARE

## 2024-04-24 VITALS
HEIGHT: 63 IN | WEIGHT: 171 LBS | SYSTOLIC BLOOD PRESSURE: 136 MMHG | OXYGEN SATURATION: 98 % | BODY MASS INDEX: 30.3 KG/M2 | DIASTOLIC BLOOD PRESSURE: 64 MMHG | TEMPERATURE: 97.7 F | HEART RATE: 61 BPM

## 2024-04-24 DIAGNOSIS — J20.6 ACUTE BRONCHITIS DUE TO RHINOVIRUS: Primary | ICD-10-CM

## 2024-04-24 DIAGNOSIS — J44.9 MODERATE COPD (CHRONIC OBSTRUCTIVE PULMONARY DISEASE) (HCC): ICD-10-CM

## 2024-04-24 PROCEDURE — G8417 CALC BMI ABV UP PARAM F/U: HCPCS | Performed by: NURSE PRACTITIONER

## 2024-04-24 PROCEDURE — 99214 OFFICE O/P EST MOD 30 MIN: CPT | Performed by: NURSE PRACTITIONER

## 2024-04-24 PROCEDURE — 1123F ACP DISCUSS/DSCN MKR DOCD: CPT | Performed by: NURSE PRACTITIONER

## 2024-04-24 PROCEDURE — G8427 DOCREV CUR MEDS BY ELIG CLIN: HCPCS | Performed by: NURSE PRACTITIONER

## 2024-04-24 PROCEDURE — 3023F SPIROM DOC REV: CPT | Performed by: NURSE PRACTITIONER

## 2024-04-24 PROCEDURE — 1036F TOBACCO NON-USER: CPT | Performed by: NURSE PRACTITIONER

## 2024-04-24 RX ORDER — BENZONATATE 200 MG/1
200 CAPSULE ORAL 3 TIMES DAILY PRN
Qty: 60 CAPSULE | Refills: 0 | Status: SHIPPED | OUTPATIENT
Start: 2024-04-24 | End: 2024-05-14

## 2024-04-24 RX ORDER — FLUTICASONE PROPIONATE AND SALMETEROL XINAFOATE 230; 21 UG/1; UG/1
2 AEROSOL, METERED RESPIRATORY (INHALATION) 2 TIMES DAILY
Qty: 1 EACH | Refills: 5 | Status: SHIPPED | OUTPATIENT
Start: 2024-04-24

## 2024-04-24 ASSESSMENT — ENCOUNTER SYMPTOMS
WHEEZING: 0
SHORTNESS OF BREATH: 1
ABDOMINAL PAIN: 0
EYES NEGATIVE: 1
COUGH: 1
VOMITING: 0
NAUSEA: 0
DIARRHEA: 0

## 2024-04-24 NOTE — PROGRESS NOTES
(LIPITOR) 20 MG tablet Take 2 tablets by mouth daily      b complex vitamins capsule Take 1 capsule by mouth daily      bethanechol (URECHOLINE) 25 MG tablet Take 2 tablets by mouth 3 times daily      clopidogrel (PLAVIX) 75 MG tablet Take 1 tablet by mouth daily      amLODIPine (NORVASC) 5 MG tablet Take by mouth daily       tamsulosin (FLOMAX) 0.4 MG capsule Take 1 capsule by mouth 2 times daily      Acetaminophen (TYLENOL 8 HOUR ARTHRITIS PAIN PO) Take by mouth 2 times daily (Patient not taking: Reported on 4/24/2024)       No current facility-administered medications for this visit.           ROS   Review of Systems   Constitutional:  Negative for activity change, appetite change, chills, fatigue, fever and unexpected weight change.   HENT: Negative.     Eyes: Negative.    Respiratory:  Positive for cough and shortness of breath. Negative for wheezing.    Cardiovascular:  Negative for chest pain, palpitations and leg swelling.   Gastrointestinal:  Negative for abdominal pain, diarrhea, nausea and vomiting.   Genitourinary: Negative.    Musculoskeletal: Negative.    Skin: Negative.    Neurological: Negative.    Hematological: Negative.    Psychiatric/Behavioral:  Positive for sleep disturbance.        Physical exam   /64 (Site: Left Upper Arm, Position: Sitting, Cuff Size: Medium Adult)   Pulse 61   Temp 97.7 °F (36.5 °C) (Skin)   Ht 1.6 m (5' 3\")   Wt 77.6 kg (171 lb)   SpO2 98%   BMI 30.29 kg/m²       Wt Readings from Last 3 Encounters:   04/24/24 77.6 kg (171 lb)   02/28/24 77.6 kg (171 lb)   07/26/23 79.7 kg (175 lb 12.8 oz)       Physical Exam  Vitals and nursing note reviewed.   Constitutional:       General: He is not in acute distress.     Appearance: He is well-developed and overweight.   HENT:      Mouth/Throat:      Lips: Pink.      Mouth: Mucous membranes are moist.      Pharynx: Oropharynx is clear. No oropharyngeal exudate or posterior oropharyngeal erythema.   Eyes:

## 2024-07-23 NOTE — PROGRESS NOTES
Psychiatric:         Mood and Affect: Mood normal.         Behavior: Behavior normal.         Thought Content: Thought content normal.         Judgment: Judgment normal.           ASSESSMENT/DIAGNOSIS     Diagnosis Orders   1. GOVIND on CPAP  Misc. Devices (CPAP MACHINE) MISC    DME Order for CPAP as OP      2. COPD with exacerbation (HCC)  ipratropium 0.5 mg-albuterol 2.5 mg (DUONEB) nebulizer solution 1 Dose               Plan   Jessee was seen today for follow-up.    Diagnoses and all orders for this visit:    GOVIND on CPAP- controlled  - Download reviewed and discussed with patient  - will decrease max pressure for comfort   - He  advised to keep good compliance with current recommended pressure to get optimal results and clinical improvement  - Recommend 7-9 hours of sleep with PAP  - He was advised to call DME company regarding supplies if needed.   -He call my office for earlier appointment if needed for worsening of sleep symptoms.     -     Misc. Devices (CPAP MACHINE) MISC; by Does not apply route Please change CPAP pressure to minimum 11 cmH20, maximum 13 cmH20  cm H20.  -     DME Order for CPAP as OP for supplies     COPD with exacerbation (HCC)- new   -     ipratropium 0.5 mg-albuterol 2.5 mg (DUONEB) nebulizer solution 1 Dose given in office today   -encouraged to use advair BID as prescribed and use Duoneb up to 4x daily , call pulmonary office if no improvement/ worsening symptoms    - keep scheduled pulm appts    -refill escribed for Duoneb     Patient verbalizes understanding.  We will see Jessee Sherman back in: 1 year with download    Information added by my medical assistant/LPN was reviewed today    billing based on medical decision making     JAYLAN Zuniga-[facility]   7/24/2024

## 2024-07-24 ENCOUNTER — OFFICE VISIT (OUTPATIENT)
Dept: PULMONOLOGY | Age: 89
End: 2024-07-24
Payer: MEDICARE

## 2024-07-24 VITALS
WEIGHT: 171.8 LBS | HEART RATE: 68 BPM | OXYGEN SATURATION: 96 % | DIASTOLIC BLOOD PRESSURE: 82 MMHG | TEMPERATURE: 97.7 F | HEIGHT: 63 IN | BODY MASS INDEX: 30.44 KG/M2 | SYSTOLIC BLOOD PRESSURE: 124 MMHG

## 2024-07-24 DIAGNOSIS — J44.1 COPD WITH EXACERBATION (HCC): ICD-10-CM

## 2024-07-24 DIAGNOSIS — G47.33 OSA ON CPAP: Primary | ICD-10-CM

## 2024-07-24 DIAGNOSIS — J44.9 MODERATE COPD (CHRONIC OBSTRUCTIVE PULMONARY DISEASE) (HCC): ICD-10-CM

## 2024-07-24 PROBLEM — Z87.891 FORMER CIGARETTE SMOKER: Status: ACTIVE | Noted: 2023-03-29

## 2024-07-24 PROBLEM — E55.9 VITAMIN D DEFICIENCY: Status: ACTIVE | Noted: 2024-03-19

## 2024-07-24 PROCEDURE — 1123F ACP DISCUSS/DSCN MKR DOCD: CPT | Performed by: NURSE PRACTITIONER

## 2024-07-24 PROCEDURE — G8417 CALC BMI ABV UP PARAM F/U: HCPCS | Performed by: NURSE PRACTITIONER

## 2024-07-24 PROCEDURE — G8427 DOCREV CUR MEDS BY ELIG CLIN: HCPCS | Performed by: NURSE PRACTITIONER

## 2024-07-24 PROCEDURE — 99214 OFFICE O/P EST MOD 30 MIN: CPT | Performed by: NURSE PRACTITIONER

## 2024-07-24 PROCEDURE — 94664 DEMO&/EVAL PT USE INHALER: CPT | Performed by: NURSE PRACTITIONER

## 2024-07-24 PROCEDURE — 1036F TOBACCO NON-USER: CPT | Performed by: NURSE PRACTITIONER

## 2024-07-24 PROCEDURE — 3023F SPIROM DOC REV: CPT | Performed by: NURSE PRACTITIONER

## 2024-07-24 RX ORDER — IPRATROPIUM BROMIDE AND ALBUTEROL SULFATE 2.5; .5 MG/3ML; MG/3ML
SOLUTION RESPIRATORY (INHALATION)
Qty: 180 ML | Refills: 5 | Status: SHIPPED | OUTPATIENT
Start: 2024-07-24

## 2024-07-24 RX ORDER — IPRATROPIUM BROMIDE AND ALBUTEROL SULFATE 2.5; .5 MG/3ML; MG/3ML
1 SOLUTION RESPIRATORY (INHALATION) ONCE
Status: COMPLETED | OUTPATIENT
Start: 2024-07-24 | End: 2024-07-24

## 2024-07-24 RX ADMIN — IPRATROPIUM BROMIDE AND ALBUTEROL SULFATE 1 DOSE: 2.5; .5 SOLUTION RESPIRATORY (INHALATION) at 10:13

## 2024-07-24 ASSESSMENT — ENCOUNTER SYMPTOMS
ABDOMINAL PAIN: 0
NAUSEA: 0
COUGH: 0
VOMITING: 0
SHORTNESS OF BREATH: 1
DIARRHEA: 0
WHEEZING: 1
EYES NEGATIVE: 1

## 2024-10-23 ENCOUNTER — OFFICE VISIT (OUTPATIENT)
Dept: PULMONOLOGY | Age: 89
End: 2024-10-23

## 2024-10-23 VITALS
OXYGEN SATURATION: 97 % | WEIGHT: 172 LBS | HEIGHT: 63 IN | DIASTOLIC BLOOD PRESSURE: 72 MMHG | TEMPERATURE: 98 F | HEART RATE: 64 BPM | BODY MASS INDEX: 30.48 KG/M2 | SYSTOLIC BLOOD PRESSURE: 124 MMHG

## 2024-10-23 DIAGNOSIS — Z91.148 NON COMPLIANCE W MEDICATION REGIMEN: ICD-10-CM

## 2024-10-23 DIAGNOSIS — J44.9 MODERATE COPD (CHRONIC OBSTRUCTIVE PULMONARY DISEASE) (HCC): Primary | ICD-10-CM

## 2024-10-23 RX ORDER — BENZONATATE 200 MG/1
200 CAPSULE ORAL 3 TIMES DAILY PRN
COMMUNITY

## 2024-10-23 RX ORDER — ARFORMOTEROL TARTRATE 15 UG/2ML
15 SOLUTION RESPIRATORY (INHALATION) 2 TIMES DAILY
Qty: 120 ML | Refills: 11 | Status: SHIPPED | OUTPATIENT
Start: 2024-10-23

## 2024-10-23 RX ORDER — RAMIPRIL 2.5 MG/1
2.5 CAPSULE ORAL DAILY
COMMUNITY

## 2024-10-23 RX ORDER — BUDESONIDE 0.25 MG/2ML
250 INHALANT ORAL 2 TIMES DAILY
Qty: 60 EACH | Refills: 11 | Status: SHIPPED | OUTPATIENT
Start: 2024-10-23

## 2024-10-23 ASSESSMENT — ENCOUNTER SYMPTOMS
SHORTNESS OF BREATH: 1
COUGH: 1

## 2024-10-23 NOTE — PATIENT INSTRUCTIONS
Stop using Advair inhaler  Continue to use DuoNeb(ipratropium/albuterol) nebulizer solutions every 6 hours as needed for wheezing    Sent new prescriptions to [facility] for 2 different nebulizer medications  1 is called budesonide or Pulmicort do 1 nebulizer treatment in the morning and 1 in the evening approximately 10 to 12 hours apart from each other.  Rinse mouth well  The other 1 is called arformoterol you will do 1 nebulizer treatment in the morning and 1 in the evening  You can do these nebulizer treatments back-to-back or mix the 2 medications together to use

## 2024-10-23 NOTE — PROGRESS NOTES
[facility]     Patient: NADIA JIMENEZ, 89 y.o.   : 1932  10/23/2024    Pt of mine      Subjective     Chief Complaint   Patient presents with    Follow-up     6 month pulmonary follow up with no testing.         HPI       Patient presents for 6 months COPD  follow up   Treated for COPD exac with cefdinir and prednisone at end of September by pcp   Symptoms improved but shortly after completion of meds, cough came back   Daily cough,bring up clear phlegm. Daily wheezing, mostly in morning and evening. Wheezing is resolved with nebulizer tx, using nebs ( Duoneb) 2-3 x per day   Neb machine Is working appropriately   He is noncompliant with Advair.  He has rarely been using this.  He was not feeling any benefit with use.  He denies ER or urgent care visits for his breathing.  He is not requiring home oxygen     Immunization History   Administered Date(s) Administered    COVID-19, MODERNA BLUE border, Primary or Immunocompromised, (age 12y+), IM, 100 mcg/0.5mL 2021, 2021, 2021, 2022    COVID-19, [place] Vaccine, Vaccine Unspecified 2021, 2022    Influenza Virus Vaccine 2008, 09/10/2009, 10/12/2010, 2011, 2012, 10/05/2020    Influenza, FLUAD, (age 65 y+), IM, Quadv, 0.5mL 10/25/2022, 10/24/2023    Influenza, FLUAD, (age 65 y+), IM, Trivalent PF, 0.5mL 2019    Influenza, FLUARIX, FLULAVAL, FLUZONE (age 6 mo+) and AFLURIA, (age 3 y+), Quadv PF, 0.5mL 2014, 10/14/2015, 10/17/2016, 10/25/2017, 10/17/2018, 2021    Pneumococcal, PCV-13, PREVNAR 13, (age 6w+), IM, 0.5mL 2018    Pneumococcal, PPSV23, PNEUMOVAX 23, (age 2y+), SC/IM, 0.5mL 2013       SOCIAL HISTORY:  Social History     Tobacco Use    Smoking status: Former     Current packs/day: 0.00     Average packs/day: 1 pack/day for 8.0 years (8.0 ttl pk-yrs)     Types: Cigarettes     Start date: 3/21/1950     Quit date: 3/21/1958     Years since

## 2025-01-22 ENCOUNTER — OFFICE VISIT (OUTPATIENT)
Dept: PULMONOLOGY | Age: 89
End: 2025-01-22
Payer: MEDICARE

## 2025-01-22 VITALS
HEART RATE: 60 BPM | TEMPERATURE: 97.4 F | OXYGEN SATURATION: 99 % | DIASTOLIC BLOOD PRESSURE: 76 MMHG | WEIGHT: 167.6 LBS | BODY MASS INDEX: 29.7 KG/M2 | SYSTOLIC BLOOD PRESSURE: 130 MMHG | HEIGHT: 63 IN

## 2025-01-22 DIAGNOSIS — J44.9 MODERATE COPD (CHRONIC OBSTRUCTIVE PULMONARY DISEASE) (HCC): Primary | ICD-10-CM

## 2025-01-22 PROCEDURE — 1036F TOBACCO NON-USER: CPT | Performed by: NURSE PRACTITIONER

## 2025-01-22 PROCEDURE — 1123F ACP DISCUSS/DSCN MKR DOCD: CPT | Performed by: NURSE PRACTITIONER

## 2025-01-22 PROCEDURE — 1159F MED LIST DOCD IN RCRD: CPT | Performed by: NURSE PRACTITIONER

## 2025-01-22 PROCEDURE — 99213 OFFICE O/P EST LOW 20 MIN: CPT | Performed by: NURSE PRACTITIONER

## 2025-01-22 PROCEDURE — 3023F SPIROM DOC REV: CPT | Performed by: NURSE PRACTITIONER

## 2025-01-22 PROCEDURE — 1160F RVW MEDS BY RX/DR IN RCRD: CPT | Performed by: NURSE PRACTITIONER

## 2025-01-22 PROCEDURE — G8427 DOCREV CUR MEDS BY ELIG CLIN: HCPCS | Performed by: NURSE PRACTITIONER

## 2025-01-22 PROCEDURE — G8417 CALC BMI ABV UP PARAM F/U: HCPCS | Performed by: NURSE PRACTITIONER

## 2025-01-22 ASSESSMENT — ENCOUNTER SYMPTOMS
COUGH: 1
SHORTNESS OF BREATH: 1

## 2025-01-22 NOTE — PROGRESS NOTES
[facility]     Patient: NADIA JIMENEZ, 89 y.o.   : 1932    Pt of mine      Subjective     Chief Complaint   Patient presents with    Follow-up     3 month pulmonary follow up with no testing.         HPI       Patient presents for 3 months COPD  follow up   Discontinued Advair, started pulmicort and Brovana nebs BID   Only using Pulmicort once daily in AM and \" is getting by well doing only once daily\"   Has Duonebs to use PRN - uses this BID   Did all 3 treatments this morning     Feels better, less SOB .   Recently on oral atb ( doxy) / steroids 1 week ago for cough   Cough significantly improved   Denies wheezing/ chest tightness        Immunization History   Administered Date(s) Administered    COVID-19, MODERNA BLUE border, Primary or Immunocompromised, (age 12y+), IM, 100 mcg/0.5mL 2021, 2021, 2021, 2022    COVID-19, [place] Vaccine, Vaccine Unspecified 2021, 2022    Influenza Virus Vaccine 2008, 09/10/2009, 10/12/2010, 2011, 2012, 10/05/2020    Influenza, FLUAD, (age 65 y+), IM, Quadv, 0.5mL 10/25/2022, 10/24/2023    Influenza, FLUAD, (age 65 y+), IM, Trivalent PF, 0.5mL 2019    Influenza, FLUARIX, FLULAVAL, FLUZONE (age 6 mo+) and AFLURIA, (age 3 y+), Quadv PF, 0.5mL 2014, 10/14/2015, 10/17/2016, 10/25/2017, 10/17/2018, 2021    Pneumococcal, PCV-13, PREVNAR 13, (age 6w+), IM, 0.5mL 2018    Pneumococcal, PPSV23, PNEUMOVAX 23, (age 2y+), SC/IM, 0.5mL 2013       SOCIAL HISTORY:  Social History     Tobacco Use    Smoking status: Former     Current packs/day: 0.00     Average packs/day: 1 pack/day for 8.0 years (8.0 ttl pk-yrs)     Types: Cigarettes     Start date: 3/21/1950     Quit date: 3/21/1958     Years since quittin.8    Smokeless tobacco: Former     Types: Chew     Quit date: 3/21/2007    Tobacco comments:     Quit smoking    Vaping Use    Vaping status: Never

## 2025-02-10 DIAGNOSIS — J44.9 MODERATE COPD (CHRONIC OBSTRUCTIVE PULMONARY DISEASE) (HCC): ICD-10-CM

## 2025-02-11 RX ORDER — IPRATROPIUM BROMIDE AND ALBUTEROL SULFATE 2.5; .5 MG/3ML; MG/3ML
SOLUTION RESPIRATORY (INHALATION)
Qty: 180 ML | Refills: 5 | Status: SHIPPED | OUTPATIENT
Start: 2025-02-11

## 2025-08-06 ENCOUNTER — OFFICE VISIT (OUTPATIENT)
Dept: PULMONOLOGY | Age: 89
End: 2025-08-06
Payer: MEDICARE

## 2025-08-06 VITALS
WEIGHT: 175.1 LBS | TEMPERATURE: 98.1 F | DIASTOLIC BLOOD PRESSURE: 66 MMHG | OXYGEN SATURATION: 98 % | HEIGHT: 62 IN | SYSTOLIC BLOOD PRESSURE: 120 MMHG | HEART RATE: 59 BPM | BODY MASS INDEX: 32.22 KG/M2

## 2025-08-06 DIAGNOSIS — M15.9 GENERALIZED OSTEOARTHRITIS OF MULTIPLE SITES: ICD-10-CM

## 2025-08-06 DIAGNOSIS — G47.33 OSA ON CPAP: Primary | ICD-10-CM

## 2025-08-06 PROCEDURE — 99214 OFFICE O/P EST MOD 30 MIN: CPT | Performed by: NURSE PRACTITIONER

## 2025-08-06 PROCEDURE — 1160F RVW MEDS BY RX/DR IN RCRD: CPT | Performed by: NURSE PRACTITIONER

## 2025-08-06 PROCEDURE — 1123F ACP DISCUSS/DSCN MKR DOCD: CPT | Performed by: NURSE PRACTITIONER

## 2025-08-06 PROCEDURE — 1036F TOBACCO NON-USER: CPT | Performed by: NURSE PRACTITIONER

## 2025-08-06 PROCEDURE — G8427 DOCREV CUR MEDS BY ELIG CLIN: HCPCS | Performed by: NURSE PRACTITIONER

## 2025-08-06 PROCEDURE — 1159F MED LIST DOCD IN RCRD: CPT | Performed by: NURSE PRACTITIONER

## 2025-08-06 PROCEDURE — G8417 CALC BMI ABV UP PARAM F/U: HCPCS | Performed by: NURSE PRACTITIONER

## 2025-08-06 RX ORDER — OXYBUTYNIN CHLORIDE 10 MG/1
TABLET, EXTENDED RELEASE ORAL
COMMUNITY
Start: 2025-07-29